# Patient Record
Sex: FEMALE | Race: ASIAN | NOT HISPANIC OR LATINO | Employment: FULL TIME | URBAN - METROPOLITAN AREA
[De-identification: names, ages, dates, MRNs, and addresses within clinical notes are randomized per-mention and may not be internally consistent; named-entity substitution may affect disease eponyms.]

---

## 2017-05-11 ENCOUNTER — OFFICE VISIT (OUTPATIENT)
Dept: MEDICAL GROUP | Facility: MEDICAL CENTER | Age: 22
End: 2017-05-11
Attending: INTERNAL MEDICINE
Payer: MEDICAID

## 2017-05-11 VITALS
OXYGEN SATURATION: 98 % | RESPIRATION RATE: 16 BRPM | HEIGHT: 65 IN | WEIGHT: 123.8 LBS | BODY MASS INDEX: 20.62 KG/M2 | HEART RATE: 100 BPM | SYSTOLIC BLOOD PRESSURE: 100 MMHG | TEMPERATURE: 98.5 F | DIASTOLIC BLOOD PRESSURE: 62 MMHG

## 2017-05-11 DIAGNOSIS — Z30.42 ON DEPO-PROVERA FOR CONTRACEPTION: ICD-10-CM

## 2017-05-11 PROCEDURE — 99213 OFFICE O/P EST LOW 20 MIN: CPT | Mod: 25 | Performed by: INTERNAL MEDICINE

## 2017-05-11 PROCEDURE — 99213 OFFICE O/P EST LOW 20 MIN: CPT | Performed by: INTERNAL MEDICINE

## 2017-05-11 PROCEDURE — 700111 HCHG RX REV CODE 636 W/ 250 OVERRIDE (IP): Performed by: INTERNAL MEDICINE

## 2017-05-11 RX ORDER — MEDROXYPROGESTERONE ACETATE 150 MG/ML
150 INJECTION, SUSPENSION INTRAMUSCULAR
Status: SHIPPED | OUTPATIENT
Start: 2017-07-27 | End: 2018-03-14

## 2017-05-11 RX ORDER — MEDROXYPROGESTERONE ACETATE 150 MG/ML
150 INJECTION, SUSPENSION INTRAMUSCULAR
Qty: 1 VIAL | Refills: 3
Start: 2017-07-27 | End: 2017-08-03

## 2017-05-11 RX ORDER — MEDROXYPROGESTERONE ACETATE 150 MG/ML
150 INJECTION, SUSPENSION INTRAMUSCULAR ONCE
Qty: 1 VIAL | Refills: 0 | Status: SHIPPED
Start: 2017-05-11 | End: 2017-05-11 | Stop reason: SDUPTHER

## 2017-05-11 RX ORDER — MEDROXYPROGESTERONE ACETATE 150 MG/ML
150 INJECTION, SUSPENSION INTRAMUSCULAR ONCE
Status: COMPLETED | OUTPATIENT
Start: 2017-05-11 | End: 2017-05-11

## 2017-05-11 RX ADMIN — MEDROXYPROGESTERONE ACETATE 150 MG: 150 INJECTION, SUSPENSION INTRAMUSCULAR at 14:09

## 2017-05-11 NOTE — PROGRESS NOTES
"Subjective:   Johanna Mata is a 22 y.o. female here today for deop injection    On Depo-Provera for contraception  Patient presents today for depo injection. He has been on depo for contraception in the past, most recent injection given around March 11 2016.  She is currently within the window to receive her next injection. She is happy with this method of birth control and tolerates it well.         Current medicines (including changes today)  Current Outpatient Prescriptions   Medication Sig Dispense Refill   • medroxyPROGESTERone (DEPO-PROVERA) 150 MG/ML Suspension 1 mL by Intramuscular route Once for 1 dose. 1 Vial 0   • meloxicam (MOBIC) 7.5 MG Tab Take 1 Tab by mouth every day. 30 Tab 1     Current Facility-Administered Medications   Medication Dose Route Frequency Provider Last Rate Last Dose   • medroxyPROGESTERone (DEPO-PROVERA) injection 150 mg  150 mg Intramuscular Once Gloria Martinez M.D.         She  has no significant past medical history    ROS   Denies abnormal vaginal bleeding, dysuria, pelvic pain  As above in HPI     Objective:     Blood pressure 100/62, pulse 100, temperature 36.9 °C (98.5 °F), resp. rate 16, height 1.651 m (5' 5\"), weight 56.155 kg (123 lb 12.8 oz), last menstrual period 11/11/2016, SpO2 98 %, not currently breastfeeding. Body mass index is 20.6 kg/(m^2).   Physical Exam:  Constitutional: Alert, no distress.  Skin: Warm, dry, good turgor, no rashes in visible areas.  Eye: Equal, round and reactive, conjunctiva clear, lids normal.  Psych: Alert and oriented x3, normal affect and mood.      Assessment and Plan:   The following treatment plan was discussed    1. On Depo-Provera for contraception  Patient currently in the window to receive her depo injection without a pregnancy test.  Given today.  She will return in 3 months for repeat injection on MA schedule.  I have ordered her depo injections for the next 3 injections.  - medroxyPROGESTERone (DEPO-PROVERA) injection " 150 mg; 1 mL by Intramuscular route Once.    Followup: Return in about 3 months (around 8/11/2017) for Depo injection.

## 2017-05-11 NOTE — MR AVS SNAPSHOT
"        Johanna Mata   2017 1:10 PM   Office Visit   MRN: [id number]    [facility]   Dept Phone:  [phone]    Description:  Female : 1995   Provider:  Gloria Martinez M.D.           Reason for Visit     Follow-Up DEPO      Allergies as of 2017     No Known Allergies      You were diagnosed with     On Depo-Provera for contraception   [[id number]]         Vital Signs     Blood Pressure Pulse Temperature Respirations Height Weight    100/62 mmHg 100 36.9 °C (98.5 °F) 16 1.651 m (5' 5\") 56.155 kg (123 lb 12.8 oz)    Body Mass Index Oxygen Saturation Last Menstrual Period Breastfeeding? Smoking Status       20.60 kg/m2 98% 2016 No Current Every Day Smoker       Basic Information     Date Of Birth Sex Race Ethnicity Preferred Language    1995 Female  Non- English      Your appointments     Aug 08, 2017  1:00 PM   Non Provider 1 with [facility]   [facility]    [address] [place] [id number]   [phone]           You will be receiving a confirmation call a few days before your appointment from our automated call confirmation system.              Problem List              ICD-10-CM Priority Class Noted - Resolved    Chronic bilateral low back pain without sciatica M54.5, G89.29   2016 - Present    Bilateral finger numbness R20.0   2016 - Present    Abnormal Pap smear of cervix R87.619   2016 - Present    On Depo-Provera for contraception Z30.40   2017 - Present      Health Maintenance        Date Due Completion Dates    IMM HEP B VACCINE (1 of 3 - Primary Series) 1993 ---    IMM HEP A VACCINE (1 of 2 - Standard Series) 1994 ---    IMM VARICELLA (CHICKENPOX) VACCINE (1 of 2 - 2 Dose Adolescent Series) 2006 ---    IMM DTaP/Tdap/Td Vaccine (1 - Tdap) 2012 ---    PAP SMEAR 2014 ---            Current Immunizations     HPV Quadrivalent Vaccine (GARDASIL) 2010, " 3/5/2010, 12/18/2009    Influenza TIV (IM) 10/3/2013    Influenza Vaccine Pediatric 12/18/2009    Influenza Vaccine Quad Inj (Preserved) 12/7/2016, 12/2/2014    Pneumococcal polysaccharide vaccine (PPSV-23) 5/11/2014  1:04 PM    Tuberculin Skin Test 2/18/2014  3:55 PM, 2/19/2013 12:15 PM, 2/11/2013  2:50 PM      Below and/or attached are the medications your provider expects you to take. Review all of your home medications and newly ordered medications with your provider and/or pharmacist. Follow medication instructions as directed by your provider and/or pharmacist. Please keep your medication list with you and share with your provider. Update the information when medications are discontinued, doses are changed, or new medications (including over-the-counter products) are added; and carry medication information at all times in the event of emergency situations     Allergies:  No Known Allergies          Medications  Valid as of: May 11, 2017 -  1:51 PM    Generic Name Brand Name Tablet Size Instructions for use    MedroxyPROGESTERone Acetate (Suspension) DEPO-PROVERA 150 MG/ML 1 mL by Intramuscular route EVERY 11 WEEKS for 7 days.        Meloxicam (Tab) MOBIC 7.5 MG Take 1 Tab by mouth every day.        .                 Medicines prescribed today were sent to:     [email or web address] [address] [facility]    [address] [facility] [address]    Phone: [phone] Fax: [phone]    Open 24 Hours?: No    [id number] - [place] - [address]    [address] [facility] [address]    Phone: [phone] Fax: [phone]    Open 24 Hours?: No      Medication refill instructions:       If your prescription bottle indicates you have medication refills left, it is not necessary to call your provider’s office. Please contact your pharmacy and they will refill your medication.    If your prescription bottle indicates you do not have any refills left, you may  request refills at any time through one of the following ways: The online BioCatch system (except Urgent Care), by calling your provider’s office, or by asking your pharmacy to contact your provider’s office with a refill request. Medication refills are processed only during regular business hours and may not be available until the next business day. Your provider may request additional information or to have a follow-up visit with you prior to refilling your medication.   *Please Note: Medication refills are assigned a new Rx number when refilled electronically. Your pharmacy may indicate that no refills were authorized even though a new prescription for the same medication is available at the pharmacy. Please request the medicine by name with the pharmacy before contacting your provider for a refill.           BioCatch Access Code: [email or web address]  Expires: 5/31/2017 11:23 AM    BioCatch  A secure, online tool to manage your health information     [employer]’s BioCatch® is a secure, online tool that connects you to your personalized health information from the privacy of your home -- day or night - making it very easy for you to manage your healthcare. Once the activation process is completed, you can even access your medical information using the BioCatch roberta, which is available for free in the Apple Roberta store or Google Play store.     BioCatch provides the following levels of access (as shown below):   My Chart Features   [facility] Doctor [facility]  Urgent  Care Non-[facility]  Primary Care  Doctor   Email your healthcare team securely and privately 24/7 X X X    Manage appointments: schedule your next appointment; view details of past/upcoming appointments X      Request prescription refills. X      View recent personal medical records, including lab and immunizations X X X X   View health record, including health history, allergies, medications X X X X   Read reports about your outpatient  visits, procedures, consult and ER notes X X X X   See your discharge summary, which is a recap of your hospital and/or ER visit that includes your diagnosis, lab results, and care plan. X X       How to register for Novelo:  1. Go to  [email or web address].  2. Click on the Sign Up Now box, which takes you to the New Member Sign Up page. You will need to provide the following information:  a. Enter your Novelo Access Code exactly as it appears at the top of this page. (You will not need to use this code after you’ve completed the sign-up process. If you do not sign up before the expiration date, you must request a new code.)   b. Enter your date of birth.   c. Enter your home email address.   d. Click Submit, and follow the next screen’s instructions.  3. Create a Novelo ID. This will be your Novelo login ID and cannot be changed, so think of one that is secure and easy to remember.  4. Create a Novelo password. You can change your password at any time.  5. Enter your Password Reset Question and Answer. This can be used at a later time if you forget your password.   6. Enter your e-mail address. This allows you to receive e-mail notifications when new information is available in Novelo.  7. Click Sign Up. You can now view your health information.    For assistance activating your Novelo account, call [phone]        Quit Tobacco Information     Do you want to quit using tobacco?    Quitting tobacco decreases risks of cancer, heart and lung disease, increases life expectancy, improves sense of taste and smell, and increases spending money, among other benefits.    If you are thinking about quitting, we can help.  • [employer] Quit Tobacco Program: 577.130.4831  o Program occurs weekly for four weeks and includes pharmacist consultation on products to support quitting smoking or chewing tobacco. A provider referral is needed for pharmacist consultation.  • Tobacco Users Help Hotline: 2-800-QUIT-NOW  (948-3892) or https://[place].quitlogix.org/  o Free, confidential telephone and online coaching for [place] residents. Sessions are designed on a schedule that is convenient for you. Eligible clients receive free nicotine replacement therapy.  • Nationally: www.smokefree.gov  o Information and professional assistance to support both immediate and long-term needs as you become, and remain, a non-smoker. Smokefree.gov allows you to choose the help that best fits your needs.

## 2017-05-11 NOTE — ASSESSMENT & PLAN NOTE
Patient presents today for depo injection. He has been on depo for contraception in the past, most recent injection given around March 11 2016.  She is currently within the window to receive her next injection. She is happy with this method of birth control and tolerates it well.

## 2018-12-10 ENCOUNTER — TELEPHONE (OUTPATIENT)
Dept: MEDICAL GROUP | Facility: PHYSICIAN GROUP | Age: 23
End: 2018-12-10

## 2018-12-10 NOTE — TELEPHONE ENCOUNTER
Pt father sees you and states that her father asked you if you would take on his daughter as a pt.       Would you like me to get her in for an appointment with you?    Please advise

## 2018-12-13 ENCOUNTER — OFFICE VISIT (OUTPATIENT)
Dept: MEDICAL GROUP | Facility: PHYSICIAN GROUP | Age: 23
End: 2018-12-13
Payer: COMMERCIAL

## 2018-12-13 VITALS
OXYGEN SATURATION: 94 % | SYSTOLIC BLOOD PRESSURE: 110 MMHG | HEART RATE: 82 BPM | TEMPERATURE: 98.4 F | HEIGHT: 65 IN | DIASTOLIC BLOOD PRESSURE: 60 MMHG | WEIGHT: 119.49 LBS | BODY MASS INDEX: 19.91 KG/M2

## 2018-12-13 DIAGNOSIS — Z23 NEED FOR VACCINATION: ICD-10-CM

## 2018-12-13 DIAGNOSIS — N91.2 AMENORRHEA: ICD-10-CM

## 2018-12-13 DIAGNOSIS — R87.619 ABNORMAL CERVICAL PAPANICOLAOU SMEAR, UNSPECIFIED ABNORMAL PAP FINDING: ICD-10-CM

## 2018-12-13 DIAGNOSIS — Z13.220 SCREENING, LIPID: ICD-10-CM

## 2018-12-13 DIAGNOSIS — F32.1 MODERATE MAJOR DEPRESSION (HCC): ICD-10-CM

## 2018-12-13 DIAGNOSIS — R05.3 CHRONIC COUGH: ICD-10-CM

## 2018-12-13 DIAGNOSIS — K59.00 CONSTIPATION, UNSPECIFIED CONSTIPATION TYPE: ICD-10-CM

## 2018-12-13 DIAGNOSIS — Z13.1 SCREENING FOR DIABETES MELLITUS (DM): ICD-10-CM

## 2018-12-13 LAB
APPEARANCE UR: ABNORMAL
BILIRUB UR STRIP-MCNC: ABNORMAL MG/DL
COLOR UR AUTO: ABNORMAL
GLUCOSE UR STRIP.AUTO-MCNC: ABNORMAL MG/DL
INT CON NEG: NEGATIVE
INT CON POS: POSITIVE
KETONES UR STRIP.AUTO-MCNC: ABNORMAL MG/DL
LEUKOCYTE ESTERASE UR QL STRIP.AUTO: ABNORMAL
NITRITE UR QL STRIP.AUTO: ABNORMAL
PH UR STRIP.AUTO: 8.5 [PH] (ref 5–8)
POC URINE PREGNANCY TEST: NORMAL
PROT UR QL STRIP: ABNORMAL MG/DL
RBC UR QL AUTO: ABNORMAL
SP GR UR STRIP.AUTO: 1.01
UROBILINOGEN UR STRIP-MCNC: 0.2 MG/DL

## 2018-12-13 PROCEDURE — 81025 URINE PREGNANCY TEST: CPT | Performed by: FAMILY MEDICINE

## 2018-12-13 PROCEDURE — 99215 OFFICE O/P EST HI 40 MIN: CPT | Mod: 25 | Performed by: FAMILY MEDICINE

## 2018-12-13 PROCEDURE — 90471 IMMUNIZATION ADMIN: CPT | Performed by: FAMILY MEDICINE

## 2018-12-13 PROCEDURE — 81002 URINALYSIS NONAUTO W/O SCOPE: CPT | Performed by: FAMILY MEDICINE

## 2018-12-13 PROCEDURE — 90686 IIV4 VACC NO PRSV 0.5 ML IM: CPT | Performed by: FAMILY MEDICINE

## 2018-12-13 RX ORDER — AZITHROMYCIN 250 MG/1
TABLET, FILM COATED ORAL
Qty: 6 TAB | Refills: 0 | Status: SHIPPED | OUTPATIENT
Start: 2018-12-13 | End: 2019-01-14

## 2018-12-13 RX ORDER — POLYETHYLENE GLYCOL 3350 17 G/17G
17 POWDER, FOR SOLUTION ORAL DAILY
Qty: 30 EACH | Refills: 5 | Status: SHIPPED | OUTPATIENT
Start: 2018-12-13 | End: 2019-06-12

## 2018-12-13 ASSESSMENT — PATIENT HEALTH QUESTIONNAIRE - PHQ9
SUM OF ALL RESPONSES TO PHQ QUESTIONS 1-9: 17
5. POOR APPETITE OR OVEREATING: 3 - NEARLY EVERY DAY
CLINICAL INTERPRETATION OF PHQ2 SCORE: 4

## 2018-12-13 NOTE — LETTER
[facility]  Gloria Martinez M.D.  [address]  [place] [address]  Fax: [phone]   Authorization for Release/Disclosure of   Protected Health Information   Name: JOHANNA MATA : 1995 SSN: [id number]   Address: [facility] [address] [place] [address] Phone:    [phone] (home)    I authorize the entity listed below to release/disclose the PHI below to:   [facility]/Gloria Martinez M.D. and Kayla Aguirre M.D.   Provider or Entity Name:    [facility]   Address   City, State, Zip   Phone:      Fax:     Reason for request: continuity of care   Information to be released:    [  ] LAST COLONOSCOPY,  including any PATH REPORT and follow-up  [  ] LAST FIT/COLOGUARD RESULT [  ] LAST DEXA  [  ] LAST MAMMOGRAM  [  ] LAST PAP  [  ] LAST LABS [  ] RETINA EXAM REPORT  [  ] IMMUNIZATION RECORDS  [  x] Release all info      [  ] Check here and initial the line next to each item to release ALL health information INCLUDING  _____ Care and treatment for drug and / or alcohol abuse  _____ HIV testing, infection status, or AIDS  _____ Genetic Testing    DATES OF SERVICE OR TIME PERIOD TO BE DISCLOSED: _____________  I understand and acknowledge that:  * This Authorization may be revoked at any time by you in writing, except if your health information has already been used or disclosed.  * Your health information that will be used or disclosed as a result of you signing this authorization could be re-disclosed by the recipient. If this occurs, your re-disclosed health information may no longer be protected by State or Federal laws.  * You may refuse to sign this Authorization. Your refusal will not affect your ability to obtain treatment.  * This Authorization becomes effective upon signing and will  on (date) __________.      If no date is indicated, this Authorization will  one (1) year from the signature date.    Name: Johanna Mata    Signature:   Date:     2018       PLEASE FAX  REQUESTED RECORDS BACK TO: [phone]

## 2018-12-13 NOTE — PROGRESS NOTES
Subjective:      Johanna Mata is a 23 y.o. female who presents with Abdominal Pain; Cough; and Patient Question (Depo shot)        HPI:    Patient is here to reestablish with me.  Her last visit with me was in .  She lost her insurance so she went to see Dr. Martinez at the [facility] in between.     Her last pap smear was in 2017 with [facility] martines came back positive for HPV. She did not have a biopsy. She was asked to follow up in one year. She does not have an appointment yet. She would like a pap smear here.  Prior to that the Pap [facility] in 2016 done at [facility] also came back positive for HPV.    The patient concerned about her Depo-Provera shots. Her last Depo-Provera shot was in April of this year with ELSI martines. She has not had a menstrual period since her last Depo-Provera shot. She stopped having Depo-Provera shots because she lost her Medicaid insurance.She has been on Depo-Provera since May 2014. She is not sexually active. Her last sexual activity was 1-2 years ago. She is  2 para 2. Her children are 4 and 5.     She complains of a productive cough that began two years ago. The phlegm is green. She has never been evaluated or treated for this cough. Patient reports she is short of breath while coughing. She denies hemoptysis, weight loss, fever or chills. She smokes four cigarettes a day. She has been smoking for ten years. She stopped smoking while pregnant.     She is constipated often. She is only passing one bowel movement a week. She has been on various stool softeners and laxatives including Senokot, Dulcolax and docusate without improvement. She has not tried Miralax or other fiber supplements. She reports some  right sided abdominal pain is noted when she is going to have a bowel movement.  This clears when she has already defecated.    She reports depression. She has been depressed for several months. Patient is a single mother and  "feels overwhelmed. She lives with her parents. The father of her children is not involved with the lives of their children. He does not currently provide child support, although, this is currently being disputed in court. She reports associated difficulty sleeping and poor appetite.     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 She denies regular exposure to hazardous chemicals.    She did not yet have her influenza vaccination.    Past medical history, past surgical history, family history reviewed-no changes    Social history reviewed-no changes    Allergies reviewed-no changes    Medications reviewed-no changes      ROS:  As per the HPI as shown above, the rest are negative.       Objective:     /60 (BP Location: Left arm, Patient Position: Sitting, BP Cuff Size: Adult)   Pulse 82   Temp 36.9 °C (98.4 °F) (Temporal)   Ht 1.651 m (5' 5\")   Wt 54.2 kg (119 lb 7.8 oz)   SpO2 94%   BMI 19.88 kg/m²     Physical Exam    Examined alert, awake, oriented, not in distress    Neck-supple, no lymphadenopathy, no thyromegaly  Lungs-clear to auscultation, no rales, no wheezes  Heart-regular rate and rhythm, no murmur  Abdomen-good bowel sounds, soft, nontender, no hepatosplenomegaly, no masses, no CVA tenderness  Extremities-no edema, clubbing, cyanosis  Psychiatric-affect is sad, tearful, judgment normal, behavior normal, thought content normal     Labs:  Office Visit on 12/13/2018   Component Date Value Ref Range Status   • POC Color 12/13/2018 yl  Negative Final   • POC Appearance 12/13/2018 clr  Negative Final   • POC Leukocyte Esterase 12/13/2018 neg  Negative Final   • POC Nitrites 12/13/2018 neg  Negative Final   • POC Urobiligen 12/13/2018 0.2  Negative (0.2) mg/dL Final   • POC Protein 12/13/2018 neg  Negative mg/dL Final   • POC Urine PH 12/13/2018 8.5* 5.0 - 8.0 Final   • POC Blood 12/13/2018 neg  Negative Final   • POC Specific Gravity " 12/13/2018 1.015  <1.005 - >1.030 Final   • POC Ketones 12/13/2018 neg  Negative mg/dL Final   • POC Bilirubin 12/13/2018 neg  Negative mg/dL Final   • POC Glucose 12/13/2018 neg  Negative mg/dL Final   • POC Urine Pregnancy Test 12/13/2018 NEG  Negative Final   • Internal Control Positive 12/13/2018 Positive   Final   • Internal Control Negative 12/13/2018 Negative   Final     Depression Screening    Little interest or pleasure in doing things?  2 - more than half the days   Feeling down, depressed , or hopeless? 2 - more than half the days   Trouble falling or staying asleep, or sleeping too much?  3 - nearly every day   Feeling tired or having little energy?  2 - more than half the days   Poor appetite or overeating?  3 - nearly every day   Feeling bad about yourself - or that you are a failure or have let yourself or your family down? 3 - nearly every day   Trouble concentrating on things, such as reading the newspaper or watching television? 0 - not at all   Moving or speaking so slowly that other people could have noticed.  Or the opposite - being so fidgety or restless that you have been moving around a lot more than usual?  2 - more than half the days   Thoughts that you would be better off dead, or of hurting yourself?  0 - not at all   Patient Health Questionnaire Score: 17       If depressive symptoms identified deferred to follow up visit unless specifically addressed in assesment and plan.    Interpretation of PHQ-9 Total Score   Score Severity   1-4 No Depression   5-9 Mild Depression   10-14 Moderate Depression   15-19 Moderately Severe Depression   20-27 Severe Depression         Assessment/Plan:   1. Need for vaccination  Patient received her influenza vaccination.  - Influenza Vaccine Quad Injection >3Y (PF)    2. Amenorrhea  I explained we must order a pregnancy test before she is started on Depo-Provera again. Her pregnancy test was negative.  The Depo-Provera shot is covered before we restart  it.  - Lipid Profile; Future  - COMP METABOLIC PANEL; Future  - CBC WITH DIFFERENTIAL; Future  - TSH; Future  - POCT Urinalysis  - POCT PREGNANCY    3. Chronic cough  I will prescribe Zithromax to treat for possible infection. This may be a smoker's cough. I recommended smoking cessation. I will order a chest x-ray since she has been having this cough for months with colored phlegm and history of tobacco dependence.  Strongly advised to quit cigarette use.  - Lipid Profile; Future  - COMP METABOLIC PANEL; Future  - CBC WITH DIFFERENTIAL; Future  - TSH; Future  - azithromycin (ZITHROMAX) 250 MG Tab; Take 2 tabs today then 1 tab daily for 4 days.  Dispense: 6 Tab; Refill: 0  - DX-CHEST-2 VIEWS; Future    4. Constipation, unspecified constipation type  We will put her on Miralax with a full glass of water on a daily basis. I advised her to continue taking the Miralax if she passes a normal bowel movement. She should decrease how much she takes if her bowel movements significantly increase in frequency. She understands there is no harm in using Miralax for maintenance.  We will check TSH to rule out metabolic problem.  - Lipid Profile; Future  - COMP METABOLIC PANEL; Future  - CBC WITH DIFFERENTIAL; Future  - TSH; Future  - POCT Urinalysis  - polyethylene glycol/lytes (MIRALAX) Pack; Take 1 Packet by mouth every day.  Dispense: 30 Each; Refill: 5    5. Abnormal cervical Papanicolaou smear, unspecified abnormal pap finding  She was positive for HPV on her pap smear from December 2016 and August of 2017. We will do a pap smear next visit.  [facility]. She signed a release for records request.  - Lipid Profile; Future  - COMP METABOLIC PANEL; Future  - CBC WITH DIFFERENTIAL; Future  - TSH; Future    6. Moderate major depression (HCC)  We will start the patient on 50 mg Zoloft half a tablet daily the first week and go up to a full tablet daily if no side effects.. I explained we will  start the dose low and adjust as needed after one month.  Made aware he can take 2 weeks a month for it to work.  I will refer the patient to a counselor.  - sertraline (ZOLOFT) 50 MG Tab; Take 1 Tab by mouth every day.  Dispense: 30 Tab; Refill: 1    7. Screening, lipid  We will do screening lipid panel.  - Lipid Profile; Future  - COMP METABOLIC PANEL; Future  - CBC WITH DIFFERENTIAL; Future  - TSH; Future    8. Screening for diabetes mellitus (DM)  We will do screening for diabetes.  - Lipid Profile; Future  - COMP METABOLIC PANEL; Future  - CBC WITH DIFFERENTIAL; Future  - TSH; Future    Patient was seen for 40 minutes face to face of which > 50% of appointment time was spent on counseling and coordination of care regarding the above.     Woody FU (Scribe), am scribing for, and in the presence of, Kayla Aguirre MD     Electronically signed by: Woody Leigh (Scribe), 12/13/2018    IKayla MD personally performed the services described in this documentation, as scribed by Woody Leigh in my presence, and it is both accurate and complete.

## 2018-12-14 ENCOUNTER — HOSPITAL ENCOUNTER (OUTPATIENT)
Dept: RADIOLOGY | Facility: MEDICAL CENTER | Age: 23
End: 2018-12-14
Attending: FAMILY MEDICINE
Payer: COMMERCIAL

## 2018-12-14 ENCOUNTER — HOSPITAL ENCOUNTER (OUTPATIENT)
Dept: LAB | Facility: MEDICAL CENTER | Age: 23
End: 2018-12-14
Attending: FAMILY MEDICINE
Payer: COMMERCIAL

## 2018-12-14 DIAGNOSIS — N91.2 AMENORRHEA: ICD-10-CM

## 2018-12-14 DIAGNOSIS — Z13.220 SCREENING, LIPID: ICD-10-CM

## 2018-12-14 DIAGNOSIS — K59.00 CONSTIPATION, UNSPECIFIED CONSTIPATION TYPE: ICD-10-CM

## 2018-12-14 DIAGNOSIS — R05.3 CHRONIC COUGH: ICD-10-CM

## 2018-12-14 DIAGNOSIS — R87.619 ABNORMAL CERVICAL PAPANICOLAOU SMEAR, UNSPECIFIED ABNORMAL PAP FINDING: ICD-10-CM

## 2018-12-14 DIAGNOSIS — Z13.1 SCREENING FOR DIABETES MELLITUS (DM): ICD-10-CM

## 2018-12-14 LAB
BASOPHILS # BLD AUTO: 1.1 % (ref 0–1.8)
BASOPHILS # BLD: 0.07 K/UL (ref 0–0.12)
EOSINOPHIL # BLD AUTO: 0.16 K/UL (ref 0–0.51)
EOSINOPHIL NFR BLD: 2.6 % (ref 0–6.9)
ERYTHROCYTE [DISTWIDTH] IN BLOOD BY AUTOMATED COUNT: 41.5 FL (ref 35.9–50)
HCT VFR BLD AUTO: 47.3 % (ref 37–47)
HGB BLD-MCNC: 15 G/DL (ref 12–16)
IMM GRANULOCYTES # BLD AUTO: 0.01 K/UL (ref 0–0.11)
IMM GRANULOCYTES NFR BLD AUTO: 0.2 % (ref 0–0.9)
LYMPHOCYTES # BLD AUTO: 2.08 K/UL (ref 1–4.8)
LYMPHOCYTES NFR BLD: 34 % (ref 22–41)
MCH RBC QN AUTO: 28.4 PG (ref 27–33)
MCHC RBC AUTO-ENTMCNC: 31.7 G/DL (ref 33.6–35)
MCV RBC AUTO: 89.6 FL (ref 81.4–97.8)
MONOCYTES # BLD AUTO: 0.31 K/UL (ref 0–0.85)
MONOCYTES NFR BLD AUTO: 5.1 % (ref 0–13.4)
NEUTROPHILS # BLD AUTO: 3.49 K/UL (ref 2–7.15)
NEUTROPHILS NFR BLD: 57 % (ref 44–72)
NRBC # BLD AUTO: 0 K/UL
NRBC BLD-RTO: 0 /100 WBC
PLATELET # BLD AUTO: 284 K/UL (ref 164–446)
PMV BLD AUTO: 9.3 FL (ref 9–12.9)
RBC # BLD AUTO: 5.28 M/UL (ref 4.2–5.4)
WBC # BLD AUTO: 6.1 K/UL (ref 4.8–10.8)

## 2018-12-14 PROCEDURE — 85025 COMPLETE CBC W/AUTO DIFF WBC: CPT

## 2018-12-14 PROCEDURE — 71046 X-RAY EXAM CHEST 2 VIEWS: CPT

## 2018-12-14 PROCEDURE — 80061 LIPID PANEL: CPT

## 2018-12-14 PROCEDURE — 36415 COLL VENOUS BLD VENIPUNCTURE: CPT

## 2018-12-14 PROCEDURE — 80053 COMPREHEN METABOLIC PANEL: CPT

## 2018-12-14 PROCEDURE — 84443 ASSAY THYROID STIM HORMONE: CPT

## 2018-12-15 LAB
ALBUMIN SERPL BCP-MCNC: 4.3 G/DL (ref 3.2–4.9)
ALBUMIN/GLOB SERPL: 1.6 G/DL
ALP SERPL-CCNC: 69 U/L (ref 30–99)
ALT SERPL-CCNC: 16 U/L (ref 2–50)
ANION GAP SERPL CALC-SCNC: 8 MMOL/L (ref 0–11.9)
AST SERPL-CCNC: 16 U/L (ref 12–45)
BILIRUB SERPL-MCNC: 0.6 MG/DL (ref 0.1–1.5)
BUN SERPL-MCNC: 14 MG/DL (ref 8–22)
CALCIUM SERPL-MCNC: 10.2 MG/DL (ref 8.5–10.5)
CHLORIDE SERPL-SCNC: 106 MMOL/L (ref 96–112)
CHOLEST SERPL-MCNC: 205 MG/DL (ref 100–199)
CO2 SERPL-SCNC: 28 MMOL/L (ref 20–33)
CREAT SERPL-MCNC: 0.69 MG/DL (ref 0.5–1.4)
GLOBULIN SER CALC-MCNC: 2.7 G/DL (ref 1.9–3.5)
GLUCOSE SERPL-MCNC: 93 MG/DL (ref 65–99)
HDLC SERPL-MCNC: 78 MG/DL
LDLC SERPL CALC-MCNC: 108 MG/DL
POTASSIUM SERPL-SCNC: 4.4 MMOL/L (ref 3.6–5.5)
PROT SERPL-MCNC: 7 G/DL (ref 6–8.2)
SODIUM SERPL-SCNC: 142 MMOL/L (ref 135–145)
TRIGL SERPL-MCNC: 96 MG/DL (ref 0–149)
TSH SERPL DL<=0.005 MIU/L-ACNC: 0.89 UIU/ML (ref 0.38–5.33)

## 2018-12-17 ENCOUNTER — PATIENT MESSAGE (OUTPATIENT)
Dept: MEDICAL GROUP | Facility: PHYSICIAN GROUP | Age: 23
End: 2018-12-17

## 2018-12-17 DIAGNOSIS — F41.9 ANXIETY: ICD-10-CM

## 2018-12-17 NOTE — PATIENT COMMUNICATION
Patient saw Dr. Aguirre last weeks.  She would like to know if she needs to stop the medication that was rx by Dr. Aguirre, she feels that's her mind and heart are racinng having headache and irratable, and trouble sleeping

## 2018-12-21 DIAGNOSIS — F41.9 ANXIETY: ICD-10-CM

## 2018-12-21 RX ORDER — HYDROXYZINE HYDROCHLORIDE 25 MG/1
25 TABLET, FILM COATED ORAL 3 TIMES DAILY PRN
Qty: 30 TAB | Refills: 1 | Status: SHIPPED | OUTPATIENT
Start: 2018-12-21 | End: 2019-01-02 | Stop reason: SDUPTHER

## 2019-01-02 ENCOUNTER — PATIENT MESSAGE (OUTPATIENT)
Dept: MEDICAL GROUP | Facility: PHYSICIAN GROUP | Age: 24
End: 2019-01-02

## 2019-01-02 RX ORDER — GABAPENTIN 100 MG/1
100-200 CAPSULE ORAL 3 TIMES DAILY PRN
Qty: 60 CAP | Refills: 0 | Status: SHIPPED | OUTPATIENT
Start: 2019-01-02 | End: 2019-01-14

## 2019-01-02 RX ORDER — HYDROXYZINE HYDROCHLORIDE 25 MG/1
25 TABLET, FILM COATED ORAL 3 TIMES DAILY PRN
Qty: 30 TAB | Refills: 1 | Status: SHIPPED | OUTPATIENT
Start: 2019-01-02 | End: 2019-01-14

## 2019-01-02 NOTE — TELEPHONE ENCOUNTER
Spoke with patient to try to get her in earlier than the 14th.  She will contact us back if she can get off work to get in

## 2019-01-14 ENCOUNTER — HOSPITAL ENCOUNTER (OUTPATIENT)
Facility: MEDICAL CENTER | Age: 24
End: 2019-01-14
Attending: FAMILY MEDICINE
Payer: COMMERCIAL

## 2019-01-14 ENCOUNTER — HOSPITAL ENCOUNTER (OUTPATIENT)
Dept: LAB | Facility: MEDICAL CENTER | Age: 24
End: 2019-01-14
Attending: FAMILY MEDICINE
Payer: COMMERCIAL

## 2019-01-14 ENCOUNTER — OFFICE VISIT (OUTPATIENT)
Dept: MEDICAL GROUP | Facility: PHYSICIAN GROUP | Age: 24
End: 2019-01-14
Payer: COMMERCIAL

## 2019-01-14 VITALS
TEMPERATURE: 98.4 F | HEIGHT: 65 IN | BODY MASS INDEX: 19.32 KG/M2 | WEIGHT: 115.96 LBS | HEART RATE: 84 BPM | DIASTOLIC BLOOD PRESSURE: 60 MMHG | SYSTOLIC BLOOD PRESSURE: 110 MMHG | OXYGEN SATURATION: 98 %

## 2019-01-14 DIAGNOSIS — F32.2 SEVERE DEPRESSION (HCC): ICD-10-CM

## 2019-01-14 DIAGNOSIS — R87.810 CERVICAL HIGH RISK HPV (HUMAN PAPILLOMAVIRUS) TEST POSITIVE: ICD-10-CM

## 2019-01-14 DIAGNOSIS — F41.9 ANXIETY: ICD-10-CM

## 2019-01-14 DIAGNOSIS — N91.2 AMENORRHEA: ICD-10-CM

## 2019-01-14 DIAGNOSIS — R10.32 LLQ PAIN: ICD-10-CM

## 2019-01-14 DIAGNOSIS — Z01.419 ENCOUNTER FOR ROUTINE GYNECOLOGICAL EXAMINATION WITH PAPANICOLAOU SMEAR OF CERVIX: ICD-10-CM

## 2019-01-14 PROBLEM — Z30.42 ON DEPO-PROVERA FOR CONTRACEPTION: Status: RESOLVED | Noted: 2017-05-11 | Resolved: 2019-01-14

## 2019-01-14 LAB — PROLACTIN SERPL-MCNC: 4.2 NG/ML (ref 2.8–26)

## 2019-01-14 PROCEDURE — 99000 SPECIMEN HANDLING OFFICE-LAB: CPT | Performed by: FAMILY MEDICINE

## 2019-01-14 PROCEDURE — 88175 CYTOPATH C/V AUTO FLUID REDO: CPT

## 2019-01-14 PROCEDURE — 87624 HPV HI-RISK TYP POOLED RSLT: CPT

## 2019-01-14 PROCEDURE — 84146 ASSAY OF PROLACTIN: CPT

## 2019-01-14 PROCEDURE — 36415 COLL VENOUS BLD VENIPUNCTURE: CPT

## 2019-01-14 PROCEDURE — 99395 PREV VISIT EST AGE 18-39: CPT | Performed by: FAMILY MEDICINE

## 2019-01-14 PROCEDURE — 99214 OFFICE O/P EST MOD 30 MIN: CPT | Mod: 25 | Performed by: FAMILY MEDICINE

## 2019-01-14 RX ORDER — HYDROXYZINE 50 MG/1
50 TABLET, FILM COATED ORAL
Qty: 30 TAB | Refills: 1 | Status: SHIPPED | OUTPATIENT
Start: 2019-01-14 | End: 2020-09-01

## 2019-01-14 RX ORDER — SERTRALINE HYDROCHLORIDE 100 MG/1
100 TABLET, FILM COATED ORAL DAILY
Qty: 30 TAB | Refills: 2 | Status: SHIPPED | OUTPATIENT
Start: 2019-01-14 | End: 2020-09-01

## 2019-01-14 ASSESSMENT — PATIENT HEALTH QUESTIONNAIRE - PHQ9
CLINICAL INTERPRETATION OF PHQ2 SCORE: 4
SUM OF ALL RESPONSES TO PHQ QUESTIONS 1-9: 23
5. POOR APPETITE OR OVEREATING: 3 - NEARLY EVERY DAY

## 2019-01-14 NOTE — LETTER
[facility]  Kayla Aguirre M.D.  [address]  [place] [address]  Fax: [phone]   Authorization for Release/Disclosure of   Protected Health Information   Name: JOHANNA MATA : 1995 SSN: [id number]   Address: [facility] [address] [place] [address] Phone:    [phone] (home)    I authorize the entity listed below to release/disclose the PHI below to:   [facility]/Kayla Aguirre M.D. and Kayla Aguirre M.D.   Provider or Entity Name:     Address   City, State, Zip   Phone:      Fax:     Reason for request: continuity of care   Information to be released:    [  ] LAST COLONOSCOPY,  including any PATH REPORT and follow-up  [  ] LAST FIT/COLOGUARD RESULT [  ] LAST DEXA  [  ] LAST MAMMOGRAM  [  ] LAST PAP  [  ] LAST LABS [  ] RETINA EXAM REPORT  [  ] IMMUNIZATION RECORDS  [  ] Release all info      [  ] Check here and initial the line next to each item to release ALL health information INCLUDING  _____ Care and treatment for drug and / or alcohol abuse  _____ HIV testing, infection status, or AIDS  _____ Genetic Testing    DATES OF SERVICE OR TIME PERIOD TO BE DISCLOSED: _____________  I understand and acknowledge that:  * This Authorization may be revoked at any time by you in writing, except if your health information has already been used or disclosed.  * Your health information that will be used or disclosed as a result of you signing this authorization could be re-disclosed by the recipient. If this occurs, your re-disclosed health information may no longer be protected by State or Federal laws.  * You may refuse to sign this Authorization. Your refusal will not affect your ability to obtain treatment.  * This Authorization becomes effective upon signing and will  on (date) __________.      If no date is indicated, this Authorization will  one (1) year from the signature date.    Name: Johanna Mata    Signature:   Date:     2019       PLEASE FAX REQUESTED RECORDS BACK  TO: [phone]

## 2019-01-15 ENCOUNTER — HOSPITAL ENCOUNTER (OUTPATIENT)
Dept: RADIOLOGY | Facility: MEDICAL CENTER | Age: 24
End: 2019-01-15
Attending: FAMILY MEDICINE
Payer: COMMERCIAL

## 2019-01-15 DIAGNOSIS — Z01.419 ENCOUNTER FOR ROUTINE GYNECOLOGICAL EXAMINATION WITH PAPANICOLAOU SMEAR OF CERVIX: ICD-10-CM

## 2019-01-15 DIAGNOSIS — R10.32 LLQ PAIN: ICD-10-CM

## 2019-01-15 DIAGNOSIS — R93.89 ABNORMAL ULTRASOUND OF PELVIS: ICD-10-CM

## 2019-01-15 DIAGNOSIS — R87.810 CERVICAL HIGH RISK HPV (HUMAN PAPILLOMAVIRUS) TEST POSITIVE: ICD-10-CM

## 2019-01-15 DIAGNOSIS — R10.32 LEFT LOWER QUADRANT PAIN: ICD-10-CM

## 2019-01-15 PROCEDURE — 76830 TRANSVAGINAL US NON-OB: CPT

## 2019-01-15 NOTE — PROGRESS NOTES
Subjective:      Johanna Mata is a 24 y.o. female who presents with Gynecologic Exam (pap and gyn)            HPI     Patient is here for routine GYN exam/Pap smear.  She said her last [facility] smear was in  [facility].  She said she is had positive HPV in 2016 and on her last Pap smear but no malignancy or dysplasia on Pap smear.  Patient is  2 para 2.  She has been amenorrheic for the last 2 years.  She was on Depo-Provera shot for a total of 4 years with the last shot in 2018.  She has not started having her period off the Depo-Provera shot.  We have done urine pregnancy test last month which came back negative.  She has not been sexually active in a while.    We started her on sertraline for moderately severe depression month ago.  We started with 25 mg 1 tablet daily and increase the dose of 50 mg daily.  She does not think this is making a difference with her problem.  We also put her on hydroxyzine 25 mg 1 tablet twice a day to help manage the anxiety symptoms.  She said that the medication helps with insomnia if she takes 2 tablets at night but not the anxiety during the day.  Dr. García who was covering for me while I was on vacation 2 weeks ago put the patient on gabapentin to see if this would help with the anxiety symptoms but she said it did not make a difference at all.  She has not worked in the last 2 weeks due to her depression.  She feels worthless.  The father of her children  is is not giving any child support and has now a new family of his own with a new baby.  She has not been talking to her parents or her friends about her depression. we have referred her to the psychologist for counseling but according to our referral department there is no psychologist contracted with insurance.  Patient was asked to call her insurance herself with regard to other options such as virtual consultation.    She started having left lower quadrant pain on and off in the last 2 weeks.   She continues to take the MiraLAX for constipation.  She said she is still constipated and the MiraLAX has not helped.  She said she has a bowel movement about every 3-4 days.  She denies any problems with urination.    I reviewed the following    History reviewed. No pertinent past medical history.     Past Surgical History:   Procedure Laterality Date   • GYN SURGERY  5/10/2014    Performed by Mindi Germain M.D. at [facility]       No Known Allergies    Current Outpatient Prescriptions   Medication Sig Dispense Refill   • sertraline (ZOLOFT) 100 MG Tab Take 1 Tab by mouth every day. 30 Tab 2   • hydrOXYzine HCl (ATARAX) 50 MG Tab Take 1 Tab by mouth every bedtime. 30 Tab 1   • polyethylene glycol/lytes (MIRALAX) Pack Take 1 Packet by mouth every day. 30 Each 5     No current facility-administered medications for this visit.         Family History   Problem Relation Age of Onset   • Diabetes Maternal Grandmother    • Hypertension Maternal Grandmother    • Cancer Maternal Grandfather 60        colon cancer   • Diabetes Paternal Grandmother    • Heart Disease Paternal Grandfather    • Diabetes Paternal Grandfather 50        fatal MI   • Hyperlipidemia Mother    • Diabetes Mother    • Lung Disease Mother         asthma   • Hypertension Mother    • Hypertension Father    • Lung Disease Brother        Social History     Social History   • Marital status: Single     Spouse name: N/A   • Number of children: N/A   • Years of education: N/A     Occupational History   • [employer] Student   • [employer] - AA business      Social History Main Topics   • Smoking status: Current Every Day Smoker     Packs/day: 0.25     Years: 10.00     Types: Cigarettes   • Smokeless tobacco: Never Used   • Alcohol use 4.8 oz/week     8 Shots of liquor per week      Comment: eight shots one day per week   • Drug use: No   • Sexual activity: Yes     Partners: Male     Birth control/ protection: Injection     Other Topics Concern   • Not  "on file     Social History Narrative   • No narrative on file        ROS     As per HPI, the rest are negative.       Objective:     /60 (BP Location: Left arm, Patient Position: Sitting, BP Cuff Size: Adult)   Pulse 84   Temp 36.9 °C (98.4 °F) (Temporal)   Ht 1.651 m (5' 5\")   Wt 52.6 kg (115 lb 15.4 oz)   SpO2 98%   BMI 19.30 kg/m²      Physical Exam   Constitutional: She is oriented to person, place, and time. She appears well-developed and well-nourished. No distress.   HENT:   Head: Normocephalic and atraumatic.   Right Ear: External ear normal.   Left Ear: External ear normal.   Nose: Nose normal.   Mouth/Throat: Oropharynx is clear and moist. No oropharyngeal exudate.   Eyes: Pupils are equal, round, and reactive to light. Conjunctivae and EOM are normal. Right eye exhibits no discharge. Left eye exhibits no discharge. No scleral icterus.   Neck: Normal range of motion. Neck supple. No tracheal deviation present. No thyromegaly present.   Cardiovascular: Normal rate, regular rhythm and normal heart sounds.  Exam reveals no gallop.    No murmur heard.  Pulmonary/Chest: Effort normal and breath sounds normal. No stridor. No respiratory distress. She has no wheezes. She has no rales. Right breast exhibits no inverted nipple, no mass, no nipple discharge, no skin change and no tenderness. Left breast exhibits no inverted nipple, no mass, no nipple discharge, no skin change and no tenderness. Breasts are symmetrical.   Abdominal: Soft. Bowel sounds are normal. She exhibits no distension and no mass. There is no tenderness. There is no rebound and no guarding. Hernia confirmed negative in the right inguinal area and confirmed negative in the left inguinal area.   Genitourinary: Vagina normal and uterus normal. No breast tenderness, discharge or bleeding. Pelvic exam was performed with patient supine. No labial fusion. There is no rash, tenderness, lesion or injury on the right labia. There is no rash, " tenderness, lesion or injury on the left labia. Uterus is not deviated, not enlarged, not fixed and not tender. Cervix exhibits friability. Cervix exhibits no motion tenderness and no discharge. Right adnexum displays tenderness. Right adnexum displays no mass and no fullness. Left adnexum displays tenderness. Left adnexum displays no mass and no fullness. No tenderness or bleeding in the vagina. No foreign body in the vagina. No vaginal discharge found.   Musculoskeletal: Normal range of motion. She exhibits no edema or tenderness.   Lymphadenopathy:     She has no cervical adenopathy.        Right: No inguinal adenopathy present.        Left: No inguinal adenopathy present.   Neurological: She is alert and oriented to person, place, and time. She displays normal reflexes. No cranial nerve deficit. She exhibits normal muscle tone. Coordination normal.   Skin: Skin is warm. No rash noted. She is not diaphoretic. No erythema. No pallor.   Psychiatric: She has a normal mood and affect. Her behavior is normal. Thought content normal.      Depression Screening    Little interest or pleasure in doing things?  1 - several days   Feeling down, depressed , or hopeless? 3 - nearly every day   Trouble falling or staying asleep, or sleeping too much?  3 - nearly every day   Feeling tired or having little energy?  3 - nearly every day   Poor appetite or overeating?  3 - nearly every day   Feeling bad about yourself - or that you are a failure or have let yourself or your family down? 3 - nearly every day   Trouble concentrating on things, such as reading the newspaper or watching television? 2 - more than half the days   Moving or speaking so slowly that other people could have noticed.  Or the opposite - being so fidgety or restless that you have been moving around a lot more than usual?  3 - nearly every day   Thoughts that you would be better off dead, or of hurting yourself?  2 - more than half the days   Patient Health  Questionnaire Score: 23       If depressive symptoms identified deferred to follow up visit unless specifically addressed in assesment and plan.    Interpretation of PHQ-9 Total Score   Score Severity   1-4 No Depression   5-9 Mild Depression   10-14 Moderate Depression   15-19 Moderately Severe Depression   20-27 Severe Depression            Assessment/Plan:     1. Encounter for routine gynecological examination with Papanicolaou smear of cervix  Complete exam was done.  Pap smear was done.  Specimen was packaged and sent to the lab.  - THINPREP PAP WITH HPV; Future    2. Cervical high risk HPV (human papillomavirus) test positive  HPV test was done.  - THINPREP PAP WITH HPV; Future    3. Severe depression (HCC)  PHQ-9 score is higher than before.  This is now consistent with severe depression.  She denies any suicidal thoughts.  Unfortunately her insurance is not contracted with psychologist in [facility].  She was instructed to call her insurance with regards to other options available for her.  Counseling done today with regards to having good support group in the family and with friends.  Increase sertraline to 100 mg 1 tablet daily.  Reevaluate in a month.  Reevaluate in a month.  - sertraline (ZOLOFT) 100 MG Tab; Take 1 Tab by mouth every day.  Dispense: 30 Tab; Refill: 2    4. Anxiety  Increase hydroxyzine to 50 mg 1 tablet at night.  Discontinue gabapentin because this is not helping.  - hydrOXYzine HCl (ATARAX) 50 MG Tab; Take 1 Tab by mouth every bedtime.  Dispense: 30 Tab; Refill: 1    5. Amenorrhea  I will check prolactin to rule out pituitary problem.  - PROLACTIN; Future    6. LLQ pain  We will get transvaginal pelvic ultrasound for further evaluation.  I am thinking this may be related to the constipation but we will have to rule out GYN pathology.  - US-PELVIC COMPLETE (TRANSABDOMINAL/TRANSVAGINAL) (COMBO); Future      Please note that this dictation was created using voice recognition software. I have  worked with consultants from the vendor as well as technical experts from [facility] to optimize the interface. I have made every reasonable attempt to correct obvious errors, but I expect that there are errors of grammar and possibly content I did not discover before finalizing the note.

## 2019-01-16 LAB
CYTOLOGY REG CYTOL: NORMAL
HPV HR 12 DNA CVX QL NAA+PROBE: NEGATIVE
HPV16 DNA SPEC QL NAA+PROBE: NEGATIVE
HPV18 DNA SPEC QL NAA+PROBE: NEGATIVE
SPECIMEN SOURCE: NORMAL

## 2019-01-17 ENCOUNTER — HOSPITAL ENCOUNTER (OUTPATIENT)
Dept: LAB | Facility: MEDICAL CENTER | Age: 24
End: 2019-01-17
Attending: FAMILY MEDICINE
Payer: COMMERCIAL

## 2019-01-17 DIAGNOSIS — R10.32 LEFT LOWER QUADRANT PAIN: ICD-10-CM

## 2019-01-17 DIAGNOSIS — R93.89 ABNORMAL ULTRASOUND OF PELVIS: ICD-10-CM

## 2019-01-17 LAB
APPEARANCE UR: ABNORMAL
BACTERIA #/AREA URNS HPF: NEGATIVE /HPF
BILIRUB UR QL STRIP.AUTO: NEGATIVE
CAOX CRY #/AREA URNS HPF: ABNORMAL /HPF
COLOR UR: YELLOW
EPI CELLS #/AREA URNS HPF: ABNORMAL /HPF
GLUCOSE UR STRIP.AUTO-MCNC: NEGATIVE MG/DL
HYALINE CASTS #/AREA URNS LPF: ABNORMAL /LPF
KETONES UR STRIP.AUTO-MCNC: ABNORMAL MG/DL
LEUKOCYTE ESTERASE UR QL STRIP.AUTO: ABNORMAL
MICRO URNS: ABNORMAL
NITRITE UR QL STRIP.AUTO: NEGATIVE
PH UR STRIP.AUTO: 6 [PH]
PROT UR QL STRIP: NEGATIVE MG/DL
RBC # URNS HPF: ABNORMAL /HPF
RBC UR QL AUTO: NEGATIVE
SP GR UR STRIP.AUTO: 1.03
UROBILINOGEN UR STRIP.AUTO-MCNC: 0.2 MG/DL
WBC #/AREA URNS HPF: ABNORMAL /HPF

## 2019-01-17 PROCEDURE — 81001 URINALYSIS AUTO W/SCOPE: CPT

## 2019-01-17 PROCEDURE — 87086 URINE CULTURE/COLONY COUNT: CPT

## 2019-01-19 LAB
BACTERIA UR CULT: ABNORMAL
BACTERIA UR CULT: ABNORMAL
SIGNIFICANT IND 70042: ABNORMAL
SITE SITE: ABNORMAL
SOURCE SOURCE: ABNORMAL

## 2019-01-21 DIAGNOSIS — N30.00 ACUTE CYSTITIS WITHOUT HEMATURIA: ICD-10-CM

## 2019-01-21 RX ORDER — AMOXICILLIN 500 MG/1
500 CAPSULE ORAL 3 TIMES DAILY
Qty: 21 CAP | Refills: 0 | Status: SHIPPED | OUTPATIENT
Start: 2019-01-21 | End: 2019-06-12

## 2019-02-11 ENCOUNTER — APPOINTMENT (OUTPATIENT)
Dept: MEDICAL GROUP | Facility: PHYSICIAN GROUP | Age: 24
End: 2019-02-11
Payer: COMMERCIAL

## 2019-04-16 ENCOUNTER — TELEPHONE (OUTPATIENT)
Dept: SCHEDULING | Facility: IMAGING CENTER | Age: 24
End: 2019-04-16

## 2019-04-17 ENCOUNTER — HOSPITAL ENCOUNTER (OUTPATIENT)
Facility: MEDICAL CENTER | Age: 24
End: 2019-04-17
Attending: PHYSICIAN ASSISTANT
Payer: MEDICAID

## 2019-04-17 ENCOUNTER — OFFICE VISIT (OUTPATIENT)
Dept: URGENT CARE | Facility: CLINIC | Age: 24
End: 2019-04-17
Payer: MEDICAID

## 2019-04-17 VITALS
SYSTOLIC BLOOD PRESSURE: 112 MMHG | RESPIRATION RATE: 16 BRPM | WEIGHT: 115 LBS | DIASTOLIC BLOOD PRESSURE: 64 MMHG | TEMPERATURE: 99.3 F | OXYGEN SATURATION: 99 % | HEIGHT: 65 IN | HEART RATE: 73 BPM | BODY MASS INDEX: 19.16 KG/M2

## 2019-04-17 DIAGNOSIS — B37.9 YEAST INFECTION: ICD-10-CM

## 2019-04-17 DIAGNOSIS — N30.00 ACUTE CYSTITIS WITHOUT HEMATURIA: ICD-10-CM

## 2019-04-17 LAB
APPEARANCE UR: NORMAL
BILIRUB UR STRIP-MCNC: NEGATIVE MG/DL
COLOR UR AUTO: NORMAL
FORWARD REASON: SPWHY: NORMAL
FORWARDED TO LAB: SPWHR: NORMAL
GLUCOSE UR STRIP.AUTO-MCNC: NEGATIVE MG/DL
KETONES UR STRIP.AUTO-MCNC: NEGATIVE MG/DL
LEUKOCYTE ESTERASE UR QL STRIP.AUTO: NORMAL
NITRITE UR QL STRIP.AUTO: NEGATIVE
PH UR STRIP.AUTO: 7 [PH] (ref 5–8)
PROT UR QL STRIP: NORMAL MG/DL
RBC UR QL AUTO: NEGATIVE
SP GR UR STRIP.AUTO: 1.02
SPECIMEN SENT: SPWT1: NORMAL
UROBILINOGEN UR STRIP-MCNC: 0.2 MG/DL

## 2019-04-17 PROCEDURE — 99214 OFFICE O/P EST MOD 30 MIN: CPT | Performed by: PHYSICIAN ASSISTANT

## 2019-04-17 PROCEDURE — 99000 SPECIMEN HANDLING OFFICE-LAB: CPT | Performed by: PHYSICIAN ASSISTANT

## 2019-04-17 PROCEDURE — 81002 URINALYSIS NONAUTO W/O SCOPE: CPT | Performed by: PHYSICIAN ASSISTANT

## 2019-04-17 RX ORDER — FLUCONAZOLE 150 MG/1
TABLET ORAL
Qty: 2 TAB | Refills: 0 | Status: SHIPPED | OUTPATIENT
Start: 2019-04-17 | End: 2019-06-12

## 2019-04-17 RX ORDER — NITROFURANTOIN 25; 75 MG/1; MG/1
100 CAPSULE ORAL EVERY 12 HOURS
Qty: 10 CAP | Refills: 0 | Status: SHIPPED | OUTPATIENT
Start: 2019-04-17 | End: 2019-04-22

## 2019-04-17 ASSESSMENT — ENCOUNTER SYMPTOMS
COUGH: 0
PALPITATIONS: 0
FEVER: 0
SHORTNESS OF BREATH: 0
FLANK PAIN: 0
CHILLS: 0
BACK PAIN: 0

## 2019-04-17 NOTE — PROGRESS NOTES
Subjective:      Johanna Mata is a 24 y.o. female who presents with UTI (x4 days, bladder pressure, burning sensation at the end, odor)            UTI   This is a new problem. The current episode started in the past 7 days. The problem occurs constantly. Associated symptoms include urinary symptoms. Pertinent negatives include no chest pain, chills, coughing or fever. Nothing aggravates the symptoms. She has tried nothing for the symptoms.       Review of Systems   Constitutional: Negative for chills and fever.   Respiratory: Negative for cough and shortness of breath.    Cardiovascular: Negative for chest pain and palpitations.   Genitourinary: Positive for dysuria, frequency and urgency. Negative for flank pain and hematuria.        Vaginal discharge   Musculoskeletal: Negative for back pain.   All other systems reviewed and are negative.    PMH:  has no past medical history on file.  MEDS:   Current Outpatient Prescriptions:   •  nitrofurantoin monohyd macro (MACROBID) 100 MG Cap, Take 1 Cap by mouth every 12 hours for 5 days., Disp: 10 Cap, Rfl: 0  •  fluconazole (DIFLUCAN) 150 MG tablet, Take 1 tablet.  Repeat in 72 hours if symptoms do not resolve., Disp: 2 Tab, Rfl: 0  •  sertraline (ZOLOFT) 100 MG Tab, Take 1 Tab by mouth every day., Disp: 30 Tab, Rfl: 2  •  amoxicillin (AMOXIL) 500 MG Cap, Take 1 Cap by mouth 3 times a day., Disp: 21 Cap, Rfl: 0  •  hydrOXYzine HCl (ATARAX) 50 MG Tab, Take 1 Tab by mouth every bedtime., Disp: 30 Tab, Rfl: 1  •  polyethylene glycol/lytes (MIRALAX) Pack, Take 1 Packet by mouth every day., Disp: 30 Each, Rfl: 5  ALLERGIES: No Known Allergies  SURGHX:   Past Surgical History:   Procedure Laterality Date   • GYN SURGERY  5/10/2014    Performed by Mindi Germain M.D. at [facility]     SOCHX:  reports that she has been smoking Cigarettes.  She has a 2.50 pack-year smoking history. She has never used smokeless tobacco. She reports that she drinks about 4.8 oz of  "alcohol per week . She reports that she does not use drugs.  FH: Family history was reviewed, no pertinent findings to report  Medications, Allergies, and current problem list reviewed today in Epic     Objective:     /64   Pulse 73   Temp 37.4 °C (99.3 °F) (Temporal)   Resp 16   Ht 1.651 m (5' 5\")   Wt 52.2 kg (115 lb)   SpO2 99%   BMI 19.14 kg/m²      Physical Exam   Constitutional: She is oriented to person, place, and time. She appears well-developed and well-nourished.   HENT:   Head: Normocephalic and atraumatic.   Right Ear: External ear normal.   Left Ear: External ear normal.   Nose: Nose normal.   Mouth/Throat: Oropharynx is clear and moist.   Neck: Normal range of motion. Neck supple.   Cardiovascular: Normal rate, regular rhythm and normal heart sounds.    Pulmonary/Chest: Effort normal and breath sounds normal.   Abdominal: Soft.   Musculoskeletal: She exhibits no tenderness.   No CVA tenderness present.   Neurological: She is alert and oriented to person, place, and time.   Skin: Skin is warm and dry.   Psychiatric: She has a normal mood and affect. Her behavior is normal. Judgment and thought content normal.   Vitals reviewed.              Assessment/Plan:   Patient is a 24-year-old female who presents with UTI-like symptoms for 7 days and white cottage cheese like discharge.  Vitals normal.  No symptoms of pyelonephritis.  Her symptoms are likely yeast infection with a UTI.    1. Acute cystitis without hematuria    - POCT Urinalysis  - Urine Culture; Future  - nitrofurantoin monohyd macro (MACROBID) 100 MG Cap; Take 1 Cap by mouth every 12 hours for 5 days.  Dispense: 10 Cap; Refill: 0    2. Yeast infection    - fluconazole (DIFLUCAN) 150 MG tablet; Take 1 tablet.  Repeat in 72 hours if symptoms do not resolve.  Dispense: 2 Tab; Refill: 0    Differential diagnosis, natural history, supportive care discussed. Follow-up with primary care provider within 7-10 days, emergency room " precautions discussed.  Patient and/or family appears understanding of information.  Handout and review of patients diagnosis and treatment was discussed extensively.

## 2019-04-23 DIAGNOSIS — N30.00 ACUTE CYSTITIS WITHOUT HEMATURIA: ICD-10-CM

## 2019-06-12 ENCOUNTER — OFFICE VISIT (OUTPATIENT)
Dept: INTERNAL MEDICINE | Facility: MEDICAL CENTER | Age: 24
End: 2019-06-12
Payer: MEDICAID

## 2019-06-12 VITALS
BODY MASS INDEX: 22.99 KG/M2 | DIASTOLIC BLOOD PRESSURE: 60 MMHG | OXYGEN SATURATION: 96 % | HEIGHT: 65 IN | HEART RATE: 69 BPM | WEIGHT: 138 LBS | TEMPERATURE: 98.4 F | SYSTOLIC BLOOD PRESSURE: 94 MMHG

## 2019-06-12 DIAGNOSIS — F33.0 MILD EPISODE OF RECURRENT MAJOR DEPRESSIVE DISORDER (HCC): ICD-10-CM

## 2019-06-12 DIAGNOSIS — Z32.00 ENCOUNTER FOR PREGNANCY TEST, RESULT UNKNOWN: ICD-10-CM

## 2019-06-12 DIAGNOSIS — F41.1 GAD (GENERALIZED ANXIETY DISORDER): ICD-10-CM

## 2019-06-12 DIAGNOSIS — Z00.00 HEALTHCARE MAINTENANCE: ICD-10-CM

## 2019-06-12 PROBLEM — F33.9 EPISODE OF RECURRENT MAJOR DEPRESSIVE DISORDER (HCC): Status: ACTIVE | Noted: 2019-06-12

## 2019-06-12 LAB
INT CON NEG: NEGATIVE
INT CON POS: POSITIVE
POC URINE PREGNANCY TEST: NEGATIVE

## 2019-06-12 PROCEDURE — 81025 URINE PREGNANCY TEST: CPT | Mod: GC | Performed by: STUDENT IN AN ORGANIZED HEALTH CARE EDUCATION/TRAINING PROGRAM

## 2019-06-12 PROCEDURE — 99214 OFFICE O/P EST MOD 30 MIN: CPT | Mod: GC | Performed by: INTERNAL MEDICINE

## 2019-06-12 RX ORDER — GABAPENTIN 100 MG/1
100 CAPSULE ORAL 2 TIMES DAILY PRN
COMMUNITY
End: 2019-06-12

## 2019-06-12 ASSESSMENT — ENCOUNTER SYMPTOMS
VOMITING: 0
NAUSEA: 0
SORE THROAT: 0
COUGH: 0
BLURRED VISION: 0
SHORTNESS OF BREATH: 0
DEPRESSION: 1
FEVER: 0
PALPITATIONS: 0
DOUBLE VISION: 0
CHILLS: 0
POLYDIPSIA: 0

## 2019-06-12 NOTE — PROGRESS NOTES
New Patient to Establish    Reason to establish: New patient to establish    CC: Switched insurance and needs a new PCP    HPI:   The patient is a 24 year old female with a history of anxiety and depression. 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     Anxiety/ depression: the patient was diagnosed with anxiety and depression and was seeing Dr. Alicea with [facility]. He had prescribed her Zoloft. She was also getting gabapentin by Dr. García when she was having too much anxiety. She states that the gabapentin was not helping. The patient been taking hydroxyzine, but it makes her drowsy so she isn't taking it during the day. The patient has never had any previous suicide attempts or thoughts of suicide. She is more wondering what her purpose is in life. She is now taking 100mg zoloft every day. She states that it has helped a lot. Her current doctor is a resident here and is graduating from [employer] and she needs a referral to a psychiatrist.     Birth control: The patient started a new birth control and was taking it inconsistently and believes she might be pregnant. Pregnancy test done in clinic today was negative.     Healthcare maintenance:   Pap smear: 1/4/2019, HPV negative due in 2022  Tdap: 5/1/2014    Patient Active Problem List    Diagnosis Date Noted   • DIANNA (generalized anxiety disorder) 06/12/2019   • Episode of recurrent major depressive disorder (HCC) 06/12/2019       No past medical history on file.    Current Outpatient Prescriptions   Medication Sig Dispense Refill   • DESOGESTREL-ETHINYL ESTRADIOL PO Take  by mouth.     • sertraline (ZOLOFT) 100 MG Tab Take 1 Tab by mouth every day. 30 Tab 2   • hydrOXYzine HCl (ATARAX) 50 MG Tab Take 1 Tab by mouth every bedtime. 30 Tab 1     No current facility-administered medications for this visit.        Allergies as of 06/12/2019   • (No Known Allergies)       Social History     Social  History   • Marital status: Single     Spouse name: N/A   • Number of children: N/A   • Years of education: N/A     Occupational History   • [employer] Student   • [employer] - AA business      Social History Main Topics   • Smoking status: Current Every Day Smoker     Packs/day: 0.25     Years: 10.00     Types: Cigarettes   • Smokeless tobacco: Never Used      Comment: 5 cigs a day   • Alcohol use 4.8 - 6.0 oz/week     8 - 10 Shots of liquor per week      Comment: Weekends only   • Drug use: No   • Sexual activity: Yes     Partners: Male     Birth control/ protection: Injection     Other Topics Concern   • Not on file     Social History Narrative   • No narrative on file       Family History   Problem Relation Age of Onset   • Diabetes Maternal Grandmother    • Hypertension Maternal Grandmother    • Cancer Maternal Grandfather 60        colon cancer   • Diabetes Paternal Grandmother    • Heart Disease Paternal Grandfather    • Diabetes Paternal Grandfather 50        fatal MI   • Hyperlipidemia Mother    • Diabetes Mother    • Lung Disease Mother         asthma   • Hypertension Mother    • Hypertension Father    • Lung Disease Brother        Past Surgical History:   Procedure Laterality Date   • GYN SURGERY  5/10/2014    Performed by Mindi Germain M.D. at [facility]       ROS: As per HPI. Additional pertinent symptoms as noted below.    Review of Systems   Constitutional: Negative for chills and fever.   HENT: Negative for congestion and sore throat.    Eyes: Negative for blurred vision and double vision.   Respiratory: Negative for cough and shortness of breath.    Cardiovascular: Negative for chest pain and palpitations.   Gastrointestinal: Negative for nausea and vomiting.   Genitourinary: Negative for dysuria and urgency.   Skin: Negative for itching and rash.   Endo/Heme/Allergies: Negative for environmental allergies and polydipsia.   Psychiatric/Behavioral: Positive for depression. Negative for suicidal  "ideas.         BP (!) 94/60 (BP Location: Left arm, Patient Position: Sitting, BP Cuff Size: Adult)   Pulse 69   Temp 36.9 °C (98.4 °F) (Temporal)   Ht 1.642 m (5' 4.65\")   Wt 62.6 kg (138 lb)   SpO2 96%   BMI 23.22 kg/m²     Physical Exam  General:  Alert and oriented, No apparent distress.    Eyes: Pupils equal and reactive. No scleral icterus.    Throat: Clear no erythema or exudates noted.    Neck: Supple. No lymphadenopathy noted. Thyroid not enlarged.    Lungs: Clear to auscultation and percussion bilaterally.    Cardiovascular: Regular rate and rhythm. No murmurs, rubs or gallops.    Abdomen:  Benign. No rebound or guarding noted.    Extremities: No clubbing, cyanosis, edema.    Skin: Clear. No rash or suspicious skin lesions noted.    Assessment and Plan    Anxiety  Depression  - Chronic, stable  - The patient has had depression and anxiety for many years and has been taking Zoloft currently managed by outpatient psychiatry with resident clinic.   - Sent referral for psychiatry since her current doctor is graduating, but encouraged the patient to continue to try to follow-up with them in the meantime since there is a high demand for psychiatry at the moment.   - Continue Sertraline 100mg daily  - Continue Hydroxyzine as needed nightly for anxiety  - Educated about meditation and breathing techniques  - Follow-up TSH to rule out other causes of depression and anxiety    Birth control  Current every day smoker  - Spent 15 minutes educating the patient on birth control and smoking cessation. The patient agreed to try and stop smoking. She is currently only smoking 5 cigarettes per day.   - Spent 10 minutes talking about birth control and its effectiveness and on making sure to take the pill every day to avoid pregnancy.   - The patient's pregnancy test in clinic today was negative    Healthcare maintenance  Pap smear: 1/4/2019, HPV negative due in 2022  Tdap: 5/1/2014  - CBC, CMP, and TSH " ordered    Followup: Return in about 4 months (around 10/12/2019), or if symptoms worsen or fail to improve, for Long.    Signed by: Valentina Welsh M.D.

## 2019-06-12 NOTE — PATIENT INSTRUCTIONS
Panic Attacks  Panic attacks are sudden, short feelings of great fear or discomfort. You may have them for no reason when you are relaxed, when you are uneasy (anxious), or when you are sleeping.  Follow these instructions at home:  · Take all your medicines as told.  · Check with your doctor before starting new medicines.  · Keep all doctor visits.  Contact a doctor if:  · You are not able to take your medicines as told.  · Your symptoms do not get better.  · Your symptoms get worse.  Get help right away if:  · Your attacks seem different than your normal attacks.  · You have thoughts about hurting yourself or others.  · You take panic attack medicine and you have a side effect.  This information is not intended to replace advice given to you by your health care provider. Make sure you discuss any questions you have with your health care provider.  Document Released: 01/20/2012 Document Revised: 05/25/2017 Document Reviewed: 08/01/2014  [employer] Interactive Patient Education © 2017 [employer]

## 2019-12-30 ENCOUNTER — HOSPITAL ENCOUNTER (OUTPATIENT)
Dept: RADIOLOGY | Facility: MEDICAL CENTER | Age: 24
End: 2019-12-30
Attending: STUDENT IN AN ORGANIZED HEALTH CARE EDUCATION/TRAINING PROGRAM
Payer: MEDICAID

## 2019-12-30 DIAGNOSIS — M25.512 ACUTE PAIN OF LEFT SHOULDER: ICD-10-CM

## 2019-12-30 PROCEDURE — 73030 X-RAY EXAM OF SHOULDER: CPT | Mod: LT

## 2020-01-06 ENCOUNTER — HOSPITAL ENCOUNTER (OUTPATIENT)
Dept: RADIOLOGY | Facility: MEDICAL CENTER | Age: 25
End: 2020-01-06
Attending: STUDENT IN AN ORGANIZED HEALTH CARE EDUCATION/TRAINING PROGRAM
Payer: MEDICAID

## 2020-01-06 DIAGNOSIS — R05.9 COUGH: ICD-10-CM

## 2020-01-06 DIAGNOSIS — R06.00 DYSPNEA, UNSPECIFIED TYPE: ICD-10-CM

## 2020-01-06 PROCEDURE — 71046 X-RAY EXAM CHEST 2 VIEWS: CPT

## 2020-09-01 ENCOUNTER — OFFICE VISIT (OUTPATIENT)
Dept: MEDICAL GROUP | Facility: PHYSICIAN GROUP | Age: 25
End: 2020-09-01
Payer: COMMERCIAL

## 2020-09-01 VITALS
HEART RATE: 80 BPM | BODY MASS INDEX: 22.99 KG/M2 | OXYGEN SATURATION: 97 % | WEIGHT: 138 LBS | HEIGHT: 65 IN | DIASTOLIC BLOOD PRESSURE: 66 MMHG | TEMPERATURE: 98.2 F | SYSTOLIC BLOOD PRESSURE: 118 MMHG | RESPIRATION RATE: 16 BRPM

## 2020-09-01 DIAGNOSIS — F33.42 RECURRENT MAJOR DEPRESSIVE DISORDER, IN FULL REMISSION (HCC): ICD-10-CM

## 2020-09-01 DIAGNOSIS — M79.671 FOOT PAIN, RIGHT: ICD-10-CM

## 2020-09-01 DIAGNOSIS — Z76.89 ESTABLISHING CARE WITH NEW DOCTOR, ENCOUNTER FOR: ICD-10-CM

## 2020-09-01 DIAGNOSIS — F41.1 GENERALIZED ANXIETY DISORDER: ICD-10-CM

## 2020-09-01 DIAGNOSIS — E78.49 OTHER HYPERLIPIDEMIA: ICD-10-CM

## 2020-09-01 PROCEDURE — 99395 PREV VISIT EST AGE 18-39: CPT | Performed by: FAMILY MEDICINE

## 2020-09-01 RX ORDER — ACETAMINOPHEN 500 MG
1000 TABLET ORAL 2 TIMES DAILY
COMMUNITY
End: 2023-09-03

## 2020-09-01 RX ORDER — MEDROXYPROGESTERONE ACETATE 150 MG/ML
INJECTION, SUSPENSION INTRAMUSCULAR
COMMUNITY
Start: 2020-07-12 | End: 2021-10-31

## 2020-09-01 ASSESSMENT — PATIENT HEALTH QUESTIONNAIRE - PHQ9
5. POOR APPETITE OR OVEREATING: SEVERAL DAYS
3. TROUBLE FALLING OR STAYING ASLEEP OR SLEEPING TOO MUCH: NOT AT ALL
7. TROUBLE CONCENTRATING ON THINGS, SUCH AS READING THE NEWSPAPER OR WATCHING TELEVISION: NOT AT ALL
1. LITTLE INTEREST OR PLEASURE IN DOING THINGS: SEVERAL DAYS
6. FEELING BAD ABOUT YOURSELF - OR THAT YOU ARE A FAILURE OR HAVE LET YOURSELF OR YOUR FAMILY DOWN: NOT AL ALL
4. FEELING TIRED OR HAVING LITTLE ENERGY: SEVERAL DAYS
SUM OF ALL RESPONSES TO PHQ9 QUESTIONS 1 AND 2: 1
2. FEELING DOWN, DEPRESSED, IRRITABLE, OR HOPELESS: NOT AT ALL
9. THOUGHTS THAT YOU WOULD BE BETTER OFF DEAD, OR OF HURTING YOURSELF: NOT AT ALL
8. MOVING OR SPEAKING SO SLOWLY THAT OTHER PEOPLE COULD HAVE NOTICED. OR THE OPPOSITE, BEING SO FIGETY OR RESTLESS THAT YOU HAVE BEEN MOVING AROUND A LOT MORE THAN USUAL: NOT AT ALL
SUM OF ALL RESPONSES TO PHQ QUESTIONS 1-9: 3

## 2020-09-01 ASSESSMENT — FIBROSIS 4 INDEX: FIB4 SCORE: 0.38

## 2020-09-01 ASSESSMENT — PAIN SCALES - GENERAL: PAINLEVEL: 3=SLIGHT PAIN

## 2020-09-01 NOTE — LETTER
[facility]  Linda Corral M.D.  [address] [place] [address]  Fax: [phone]   Authorization for Release/Disclosure of   Protected Health Information   Name: JOHANNA MATA : 1995 SSN: [id number]   Address: [address] [place] [address] [address] [place] [address] Phone:    [phone] (home)    I authorize the entity listed below to release/disclose the PHI below to:   [facility]/Linda Corral M.D. and Linda Corral M.D.   Provider or Entity Name:  [facility]   Most recent depo shot date please   Address   City, State, Zip   Phone:      Fax:     Reason for request: continuity of care   Information to be released:    [  ] LAST COLONOSCOPY,  including any PATH REPORT and follow-up  [  ] LAST FIT/COLOGUARD RESULT [  ] LAST DEXA  [  ] LAST MAMMOGRAM  [  ] LAST PAP  [  ] LAST LABS [  ] RETINA EXAM REPORT  [  ] IMMUNIZATION RECORDS  [  ] Release all info      [  ] Check here and initial the line next to each item to release ALL health information INCLUDING  _____ Care and treatment for drug and / or alcohol abuse  _____ HIV testing, infection status, or AIDS  _____ Genetic Testing    DATES OF SERVICE OR TIME PERIOD TO BE DISCLOSED: _____________  I understand and acknowledge that:  * This Authorization may be revoked at any time by you in writing, except if your health information has already been used or disclosed.  * Your health information that will be used or disclosed as a result of you signing this authorization could be re-disclosed by the recipient. If this occurs, your re-disclosed health information may no longer be protected by State or Federal laws.  * You may refuse to sign this Authorization. Your refusal will not affect your ability to obtain treatment.  * This Authorization becomes effective upon signing and will  on (date) __________.      If no date is indicated, this Authorization will  one (1) year from the signature date.    Name: Johanna Mata    Signature:   Date:          9/1/2020       PLEASE FAX REQUESTED RECORDS BACK TO: [phone]

## 2020-09-01 NOTE — PROGRESS NOTES
CC: establish care/annual exam                                                                                                                           Johanna presents today  To establish care.  She was previously under the care of Dr. Valentina Welsh at [facility] and then Dr Aguirre before that.     Right foot pain  Right foot arch pain. Works a a . Noticed it 3 months ago. Has it daily at work.  Walks tiptoes.radiates to right knee.   Tylenol. Worse with walking. Better with rest and soaking it. Only taking tylenol as needed with some improvement. States that she is walking anywhere from 10-12 miles a day.       Depression in remission/ anxiety  Chronic diagnosis.  Stopped zoloft 4 months ago on own.  Took it for a year. Has been doing well without this medication . phq score 3 and darek score 0.     Hyperlipidemia  Chronic medical diagnosis.  Previous labs from December 2018 have total cholesterol 205, triglycerides 96, HDL 78, and . Denies any chest pain or palpitations    Vaccines discussed with patient and thinks she had chickenpox as a child.     Patient Active Problem List    Diagnosis Date Noted   • Cigarette nicotine dependence 09/02/2020   • Foot pain, right 09/01/2020   • Other hyperlipidemia 09/01/2020   • DAREK (generalized anxiety disorder) 06/12/2019   • Recurrent major depressive disorder, in full remission (HCC) 06/12/2019   • Depo-Provera contraceptive status 05/11/2017       Current Outpatient Medications   Medication Sig Dispense Refill   • medroxyPROGESTERone 150 MG/ML Suspension Prefilled Syringe Indications: Pt gets Contraceptive shot every 3 months     • acetaminophen (TYLENOL) 500 MG Tab Take 1,000 mg by mouth 2 times a day. Indications: Pain       No current facility-administered medications for this visit.          Allergies as of 09/01/2020   • (No Known Allergies)          /66 (BP Location: Left arm, Patient Position: Sitting, BP Cuff Size: Small adult)   Pulse 80   Temp  "36.8 °C (98.2 °F) (Temporal)   Resp 16   Ht 1.651 m (5' 5\")   Wt 62.6 kg (138 lb)   SpO2 97%   BMI 22.96 kg/m²     Physical Exam:  Gen:         Alert and oriented, No apparent distress.  Neck:        supple, No Lymphadenopathy  Lungs:     Clear to auscultation bilaterally  CV:          Regular rate and rhythm. no LE edema             Ext:          No clubbing, cyanosis      Assessment and Plan.   25 y.o. female with the following issues.    Johanna was seen today for establish care and foot pain.    Diagnoses and all orders for this visit:    Establishing care with new doctor, encounter for  Patient Counseling:  --Discussed moderation in sodium/caffeine intake, saturated fat and cholesterol, caloric balance, sufficient fresh fruits/vegetables, fiber,   --Discussed brushing, flossing, and dental visits.   --Encouraged regular exercise.   --Discussed tobacco, alcohol, or other drug use; availability of treatment for abuse.   --Injury prevention: Discussed safety belts, safety helmets, smoke detector, etc.    DIANNA (generalized anxiety disorder)  Chronic. Stable. Off meds    Foot pain, right  New problem. Not improving. Ibuprofen prn x 1 week.  -     REFERRAL TO PHYSICAL THERAPY Reason for Therapy: Eval/Treat/Report    Other hyperlipidemia  Chronic. Not well controlled.   -     CBC WITH DIFFERENTIAL; Future  -     Comp Metabolic Panel; Future  -     Lipid Profile; Future    Recurrent major depressive disorder, in full remission (HCC)        Follow up: 6 months.     "

## 2020-09-02 PROBLEM — F17.210 CIGARETTE NICOTINE DEPENDENCE: Status: ACTIVE | Noted: 2020-09-02

## 2021-10-31 ENCOUNTER — OFFICE VISIT (OUTPATIENT)
Dept: URGENT CARE | Facility: PHYSICIAN GROUP | Age: 26
End: 2021-10-31
Payer: COMMERCIAL

## 2021-10-31 ENCOUNTER — HOSPITAL ENCOUNTER (OUTPATIENT)
Dept: RADIOLOGY | Facility: MEDICAL CENTER | Age: 26
End: 2021-10-31
Attending: FAMILY MEDICINE
Payer: COMMERCIAL

## 2021-10-31 VITALS
WEIGHT: 130 LBS | OXYGEN SATURATION: 99 % | HEIGHT: 65 IN | DIASTOLIC BLOOD PRESSURE: 78 MMHG | SYSTOLIC BLOOD PRESSURE: 128 MMHG | TEMPERATURE: 97.3 F | BODY MASS INDEX: 21.66 KG/M2 | RESPIRATION RATE: 16 BRPM | HEART RATE: 91 BPM

## 2021-10-31 DIAGNOSIS — M25.559 HIP PAIN: ICD-10-CM

## 2021-10-31 DIAGNOSIS — Z23 NEED FOR VACCINATION: ICD-10-CM

## 2021-10-31 PROCEDURE — 73502 X-RAY EXAM HIP UNI 2-3 VIEWS: CPT | Mod: RT

## 2021-10-31 PROCEDURE — 90471 IMMUNIZATION ADMIN: CPT | Performed by: FAMILY MEDICINE

## 2021-10-31 PROCEDURE — 90686 IIV4 VACC NO PRSV 0.5 ML IM: CPT | Performed by: FAMILY MEDICINE

## 2021-10-31 PROCEDURE — 99214 OFFICE O/P EST MOD 30 MIN: CPT | Performed by: FAMILY MEDICINE

## 2021-10-31 RX ORDER — NAPROXEN 500 MG/1
500 TABLET ORAL 2 TIMES DAILY WITH MEALS
Qty: 15 TABLET | Refills: 1 | Status: SHIPPED | OUTPATIENT
Start: 2021-10-31 | End: 2022-01-12

## 2021-10-31 ASSESSMENT — ENCOUNTER SYMPTOMS: MYALGIAS: 1

## 2021-10-31 NOTE — PROGRESS NOTES
"Subjective     Johanna Mata is a 26 y.o. female who presents with Leg Pain (R upper leg [id number] )      - This is a pleasant and nontoxic appearing 26 y.o. female with c/o Rt hip pain x 2 weeks. No specific injury. Worse w/ walking/sitting. Better it pushes or light punching over area.       ALLERGIES:  Patient has no known allergies.     PMH:  History reviewed. No pertinent past medical history.     PSH:  Past Surgical History:   Procedure Laterality Date   • GYN SURGERY  5/10/2014    Performed by Mindi Germain M.D. at [facility]       MEDS:    Current Outpatient Medications:   •  acetaminophen (TYLENOL) 500 MG Tab, Take 1,000 mg by mouth 2 times a day. Indications: Pain, Disp: , Rfl:     ** I have documented what I find to be significant in regards to past medical, social, family and surgical history  in my HPI or under PMH/PSH/FH review section, otherwise it is noncontributory **           HPI    Review of Systems   Musculoskeletal: Positive for myalgias.   All other systems reviewed and are negative.             Objective     /78   Pulse 91   Temp 36.3 °C (97.3 °F) (Temporal)   Resp 16   Ht 1.651 m (5' 5\")   Wt 59 kg (130 lb)   SpO2 99%   BMI 21.63 kg/m²      Physical Exam  Vitals and nursing note reviewed.   Constitutional:       General: She is not in acute distress.     Appearance: Normal appearance. She is well-developed.   HENT:      Head: Normocephalic and atraumatic.   Eyes:      General: No scleral icterus.  Cardiovascular:      Heart sounds: Normal heart sounds. No murmur heard.     Pulmonary:      Effort: Pulmonary effort is normal. No respiratory distress.   Musculoskeletal:        Legs:       Comments: Rt hip: good SROM, some pain w/ flexing and IR   Skin:     Coloration: Skin is not jaundiced or pale.   Neurological:      Mental Status: She is alert.      Motor: No abnormal muscle tone.   Psychiatric:         Mood and Affect: Mood normal.         Behavior: " Behavior normal.                     Assessment & Plan          1. Hip pain  DX-HIP-COMPLETE - UNILATERAL 2+ RIGHT    Referral to Orthopedics     Xray: no acute findings by MY READ. RADIOLOGY READ PENDING.     - Dx, plan & d/c instructions discussed   - Rest, stay hydrated, OTC Motrin and/or Tylenol as needed  - E.R. precautions discussed     Asked to kindly follow up with their PCP's office in 2-3 days for a recheck, ER if not improving or feeling/getting worse.    Any realistic side effects of medications that may have been given today reviewed.     Patient left in stable condition     POCT results reviewed/discussed    Today's radiology imaging personally reviewed by me today on day of visit and Radiology readings reviewed and discussed w/ patient today.

## 2021-10-31 NOTE — LETTER
October 31, 2021         Patient: Johanna Mata   YOB: 1995   Date of Visit: 10/31/2021           To Whom it May Concern:    Johanna Mata was seen in my clinic on 10/31/2021. She may return to work this Thursday, excuse any recent missed days work in past week.    If you have any questions or concerns, please don't hesitate to call.        Sincerely,           Aiden Duarte M.D.  Electronically Signed

## 2021-11-28 ENCOUNTER — HOSPITAL ENCOUNTER (OUTPATIENT)
Dept: RADIOLOGY | Facility: MEDICAL CENTER | Age: 26
End: 2021-11-28
Attending: ORTHOPAEDIC SURGERY
Payer: COMMERCIAL

## 2021-11-28 DIAGNOSIS — M25.551 RIGHT HIP PAIN: ICD-10-CM

## 2021-11-28 PROCEDURE — 73721 MRI JNT OF LWR EXTRE W/O DYE: CPT | Mod: RT

## 2022-06-03 PROBLEM — M24.151 ARTICULAR CARTILAGE DISORDER OF HIP, RIGHT: Status: ACTIVE | Noted: 2022-06-03

## 2022-06-24 ENCOUNTER — PHYSICAL THERAPY (OUTPATIENT)
Dept: PHYSICAL THERAPY | Facility: REHABILITATION | Age: 27
End: 2022-06-24
Attending: ORTHOPAEDIC SURGERY
Payer: COMMERCIAL

## 2022-06-24 DIAGNOSIS — S73.191A TEAR OF ACETABULAR LABRUM, RIGHT, INITIAL ENCOUNTER: ICD-10-CM

## 2022-06-24 DIAGNOSIS — M25.551 RIGHT HIP PAIN: ICD-10-CM

## 2022-06-24 PROCEDURE — 97110 THERAPEUTIC EXERCISES: CPT

## 2022-06-24 PROCEDURE — 97161 PT EVAL LOW COMPLEX 20 MIN: CPT

## 2022-06-24 SDOH — ECONOMIC STABILITY: GENERAL: QUALITY OF LIFE: FAIR

## 2022-06-24 ASSESSMENT — ENCOUNTER SYMPTOMS
PAIN SCALE: 2
PAIN SCALE AT HIGHEST: 5
PAIN SCALE AT LOWEST: 1

## 2022-06-24 NOTE — OP THERAPY EVALUATION
Outpatient Physical Therapy  INITIAL EVALUATION    [facility] [place]  [address], [address]  [facility] [address]  Phone:  [phone]  Fax:  [phone]    Date of Evaluation: 2022    Patient: Johanna Mata  YOB: 1995  MRN: [id number]     Referring Provider: Tejinder Solis M.D.  [address] [place] [address]   Referring Diagnosis Right hip pain [M25.551];Tear of right acetabular labrum, initial encounter [S73.191A]     Time Calculation  Start time: 1545  Stop time: 1630 Time Calculation (min): 45 minutes         Chief Complaint: Hip Problem and Post-Op Pain    Visit Diagnoses     ICD-10-CM   1. Right hip pain  M25.551   2. Tear of acetabular labrum, right, initial encounter  S73.191A       Date of onset of impairment: 2022    Subjective:   History of Present Illness:     Date of onset:  10/1/2021  Quality of life:  Fair  Prior level of function:  Ongoing hip pain for 2 years  Pain:     Current pain ratin    At best pain ratin    At worst pain ratin  Activities of Daily Living:     Patient reported ADL status: Limited with ambulation  Limited with hip flexion  Limited with ADL  Limited ability to beatrice clothes   Patient Goals:     Patient goals for therapy:  Increased motion, increased strength and decreased pain    Patient is a 27 y.o. female that presents to therapy status post labral repair, acetabular rim trim, femoral neck osteochondroplasy. States that the surgery was need due to increasing hip pain and failed conservative mangement. Reports the pain quality to be dull, constant and are primarily in the anterior hip. Reports that symptoms now getting better / not changing. Notes minor numbness and tingling along the lateral leg and groin area.   No past medical history on file.  Past Surgical History:   Procedure Laterality Date   • [place] ARTHROSCOPY HIP W/LABRAL REPAIR Right 2022    Procedure: Right hip  arthroscopy, labral repair, acetabular rim trimming, femoral neck osteochondroplasty;  Surgeon: Tejinder Solis M.D.;  Location: [facility];  Service: Orthopedics   • NM ARTHROSCOPY HIP W/FEMOROPLASTY Right 2022    Procedure: OSTEOPLASTY, FEMUR, NECK;  Surgeon: Tejinder Solis M.D.;  Location: [facility];  Service: Orthopedics   • NM ARTHROSCOPY HIP W/ACETABULOPLASTY Right 2022    Procedure: OSTEOPLASTY, ACETABULUM;  Surgeon: Tejinder Solis M.D.;  Location: [facility];  Service: Orthopedics   • GYN SURGERY  5/10/2014    Performed by Mindi Germain M.D. at [facility]     Social History     Tobacco Use   • Smoking status: Former Smoker     Packs/day: 0.25     Years: 10.00     Pack years: 2.50     Types: Cigarettes     Quit date: 10/24/2021     Years since quittin.6   • Smokeless tobacco: Never Used   • Tobacco comment: 5-6 cigs a day   Substance Use Topics   • Alcohol use: Not Currently     Alcohol/week: 4.8 - 6.0 oz     Types: 8 - 10 Shots of liquor per week     Comment: Weekends only     Family and Occupational History     Socioeconomic History   • Marital status: Single     Spouse name: Not on file   • Number of children: Not on file   • Years of education: Not on file   • Highest education level: Not on file   Occupational History   • Occupation: [employer]     Employer: student   • Occupation: [employer]       Objective     Observations     Right Hip  Positive for incision.     Additional Observation Details  No signs of infection    Postural Observations  Seated posture: fair  Standing posture: fair        Neurological Testing     Reflexes   Left   Patellar (L4): normal (2+)  Achilles (S1): normal (2+)  Ankle clonus reflex: negative  Babinski sign: negative    Right   Patellar (L4): normal (2+)  Achilles (S1): normal (2+)  Ankle clonus reflex: negative  Babinski sign:  negative    Myotome testing   Lumbar (left)   L3 (knee extensors): 5  L4 (ankle dorsiflexors): 5  L5 (great toe extension): 5  S1 (ankle plantar flexors): 5    Lumbar (right)   L3 (knee extensors): 3-  L4 (ankle dorsiflexors): 5  L5 (great toe extension): 5    Dermatome testing   Lumbar (left)   All left lumbar dermatomes intact    Lumbar (right)   All right lumbar dermatomes intact    Passive Range of Motion   Left Hip   Flexion: 125 degrees   External rotation (90/90): 66 degrees   Internal rotation (90/90): 12 degrees     Right Hip   Flexion: 90 degrees WFL  External rotation (90/90): 20 degrees     Strength:      Left Knee   Flexion: 4-    Right Knee   Flexion: 3-        Therapeutic Exercises (CPT 09353):     1. Ankle pumps, x20    2. Quad sets, x20    3. Glut sets, x20    4. Edu on precatuions, x5min      Time-based treatments/modalities:    Physical Therapy Timed Treatment Charges  Therapeutic exercise minutes (CPT 70601): 10 minutes      Assessment, Response and Plan:   Impairments: abnormal or restricted ROM, activity intolerance, impaired physical strength and pain with function    Assessment details:  Patient presents with signs and symptoms consistent with a post op condition. Patient limitations include weakness, decreased ROM, and pain. Patient will benefit from skilled therapy to improve the aforementioned deficits and decrease further functional decline.   Prognosis: good    Goals:   Short Term Goals:   1) Patient's quad strength will improve by a half muscle grade to facilitate improved gait.  2) Patient's ROM will improve to 90 to facilitate progression with program.  Short term goal time span:  2-4 weeks      Long Term Goals:    1) Patient's symptoms will improve to allow for ambulation without crutches.  2) Patient's LEFS will improve by 30 to demonstrate functional improvement  Long term goal time span:  6-8 weeks    Plan:   Therapy options:  Physical therapy treatment to continue  Planned  therapy interventions:  E Stim Unattended (CPT 24431), Manual Therapy (CPT 51557), Neuromuscular Re-education (CPT 80291), Therapeutic Exercise (CPT 56652) and Hot or Cold Pack Therapy (CPT 30290)  Frequency:  2x week  Duration in weeks:  12  Discussed with:  Patient      Functional Assessment Used  PT Functional Assessment Tool Used: LEFS  PT Functional Assessment Score: 6     Referring provider co-signature:  I have reviewed this plan of care and my co-signature certifies the need for services.    Certification Period: 06/24/2022 to  09/09/22    Physician Signature: ________________________________ Date: ______________

## 2022-06-29 ENCOUNTER — PHYSICAL THERAPY (OUTPATIENT)
Dept: PHYSICAL THERAPY | Facility: REHABILITATION | Age: 27
End: 2022-06-29
Attending: ORTHOPAEDIC SURGERY
Payer: COMMERCIAL

## 2022-06-29 DIAGNOSIS — S73.191A TEAR OF ACETABULAR LABRUM, RIGHT, INITIAL ENCOUNTER: ICD-10-CM

## 2022-06-29 DIAGNOSIS — M25.551 RIGHT HIP PAIN: ICD-10-CM

## 2022-06-29 DIAGNOSIS — M24.151 DEGENERATIVE TEAR OF ACETABULAR LABRUM OF RIGHT HIP: ICD-10-CM

## 2022-06-29 PROCEDURE — 97110 THERAPEUTIC EXERCISES: CPT

## 2022-06-29 PROCEDURE — 97014 ELECTRIC STIMULATION THERAPY: CPT

## 2022-06-29 PROCEDURE — 97140 MANUAL THERAPY 1/> REGIONS: CPT

## 2022-06-29 NOTE — OP THERAPY DAILY TREATMENT
Outpatient Physical Therapy  DAILY TREATMENT     [facility] [place]  [address], [address]  [facility] [address]  Phone:  [phone]  Fax:  [phone]    Date: 06/29/2022    Patient: Johanna Mata  YOB: 1995  MRN: [id number]     Time Calculation    Start time: 0815  Stop time: 0900 Time Calculation (min): 45 minutes         Chief Complaint: Hip Problem and Post-Op Pain    Visit #: 2    SUBJECTIVE:  Patient reports that she is doing well notes decreasing symptoms.     OBJECTIVE:  Current objective measures:   Able to tolerate upright bike          Therapeutic Exercises (CPT 49155):     1. Ankle pumps, x20    2. Quad sets, x20    3. Glut sets, x20    5. Gait training with crutches, x5min    6. Bike , x10min upright    Therapeutic Treatments and Modalities:     1. E Stim Unattended (CPT 74980), IFC with CP x15min to R hip    2. Manual Therapy (CPT 21504), PROM to limits per protocol    Time-based treatments/modalities:    Physical Therapy Timed Treatment Charges  Manual therapy minutes (CPT 93559): 10 minutes  Therapeutic exercise minutes (CPT 40783): 20 minutes      Pain rating (1-10) before treatment:  1  Pain rating (1-10) after treatment:  0    ASSESSMENT:   Response to treatment: Patient responded well to therapy with an overall decrease in symptoms and tolerance of phase 1 protocol.    PLAN/RECOMMENDATIONS:   Plan for treatment: therapy treatment to continue next visit.  Planned interventions for next visit: continue with current treatment.

## 2022-07-01 ENCOUNTER — PHYSICAL THERAPY (OUTPATIENT)
Dept: PHYSICAL THERAPY | Facility: REHABILITATION | Age: 27
End: 2022-07-01
Attending: FAMILY MEDICINE
Payer: COMMERCIAL

## 2022-07-01 DIAGNOSIS — S73.191A TEAR OF ACETABULAR LABRUM, RIGHT, INITIAL ENCOUNTER: ICD-10-CM

## 2022-07-01 DIAGNOSIS — M25.551 RIGHT HIP PAIN: ICD-10-CM

## 2022-07-01 DIAGNOSIS — M24.151 DEGENERATIVE TEAR OF ACETABULAR LABRUM OF RIGHT HIP: ICD-10-CM

## 2022-07-01 PROCEDURE — 97014 ELECTRIC STIMULATION THERAPY: CPT

## 2022-07-01 PROCEDURE — 97110 THERAPEUTIC EXERCISES: CPT

## 2022-07-02 NOTE — OP THERAPY DAILY TREATMENT
Outpatient Physical Therapy  DAILY TREATMENT     [facility] [place]  [address]  [facility] [address]  Phone:  [phone]  Fax:  [phone]    Date: 07/01/2022    Patient: Johanna Mata  YOB: 1995  MRN: [id number]     Time Calculation    Start time: 1545  Stop time: 1630 Time Calculation (min): 45 minutes         Chief Complaint: Hip Problem and Post-Op Pain    Visit #: 3    SUBJECTIVE:  Patient is reporting only minor soreness.     OBJECTIVE:  Current objective measures:           Therapeutic Exercises (CPT 59319):     1. Ankle pumps, x20    2. Quad sets, x20    3. Glut sets, x20    5. Gait training with crutches, x5min    6. Bike , x10min upright    7. Supine bridge on round, x20    Therapeutic Treatments and Modalities:     1. E Stim Unattended (CPT 13324), IFC with CP x15min to R hip    Time-based treatments/modalities:    Physical Therapy Timed Treatment Charges  Therapeutic exercise minutes (CPT 29364): 30 minutes      Pain rating (1-10) before treatment:  1  Pain rating (1-10) after treatment:  0    ASSESSMENT:   Response to treatment: Patient responded well to therapy with an overall decrease in symptoms and improvement in function. Plan to continue per protocol.     PLAN/RECOMMENDATIONS:   Plan for treatment: therapy treatment to continue next visit.  Planned interventions for next visit: continue with current treatment.

## 2022-07-05 ENCOUNTER — PHYSICAL THERAPY (OUTPATIENT)
Dept: PHYSICAL THERAPY | Facility: REHABILITATION | Age: 27
End: 2022-07-05
Attending: FAMILY MEDICINE
Payer: COMMERCIAL

## 2022-07-05 DIAGNOSIS — M25.551 RIGHT HIP PAIN: ICD-10-CM

## 2022-07-05 DIAGNOSIS — S73.191A TEAR OF ACETABULAR LABRUM, RIGHT, INITIAL ENCOUNTER: ICD-10-CM

## 2022-07-05 PROCEDURE — 97110 THERAPEUTIC EXERCISES: CPT

## 2022-07-05 PROCEDURE — 97014 ELECTRIC STIMULATION THERAPY: CPT

## 2022-07-05 NOTE — OP THERAPY DAILY TREATMENT
Outpatient Physical Therapy  DAILY TREATMENT     [facility] Outpatient Physical Therapy [address], [address]  [facility] [address]  Phone:  [phone]  Fax:  [phone]    Date: 07/05/2022    Patient: Johanna Mata  YOB: 1995  MRN: [id number]     Time Calculation    Start time: 1500  Stop time: 1600 Time Calculation (min): 60 minutes         Chief Complaint: Hip Injury and Post-Op Pain    Visit #: 4    SUBJECTIVE:  States she is using stationary upright bike at home a few times a week.  Doing HEP regularly.  Feeling a little better each week.    OBJECTIVE:        Therapeutic Exercises (CPT 26900):     1. Ankle pumps    2. Quad sets, 5 sec x10    4. Passive supine hip IR/ER, x2 min     5. Prone neutral hip extension stretch, 2 x1 min    6. Bike , x10min upright    7. Supine bridge on round, x20    8. Prone hip IR, x15    9. Hooklying isometric TA ball press, 5 sec x10    10. Supine isometric hip abd and add, 5sec x10    11. Hooklying isometric HS curl into ball, 5sec x10    12. Supine DKC ball roll, x20     13. Hooklying TA bracing with ball OH flexion, x10    Therapeutic Treatments and Modalities:     1. E Stim Unattended (CPT 43193), IFC with CP x15min to R hip    Time-based treatments/modalities:    Physical Therapy Timed Treatment Charges  Therapeutic exercise minutes (CPT 85642): 40 minutes    ASSESSMENT:   Response to treatment: Pt demo good tolerance to mild open chain progressions today.      PLAN/RECOMMENDATIONS:   Plan for treatment: therapy treatment to continue next visit.  Planned interventions for next visit: continue with current treatment.

## 2022-07-07 ENCOUNTER — PHYSICAL THERAPY (OUTPATIENT)
Dept: PHYSICAL THERAPY | Facility: REHABILITATION | Age: 27
End: 2022-07-07
Attending: FAMILY MEDICINE
Payer: COMMERCIAL

## 2022-07-07 DIAGNOSIS — M24.151 DEGENERATIVE TEAR OF ACETABULAR LABRUM OF RIGHT HIP: ICD-10-CM

## 2022-07-07 DIAGNOSIS — S73.191A TEAR OF ACETABULAR LABRUM, RIGHT, INITIAL ENCOUNTER: ICD-10-CM

## 2022-07-07 DIAGNOSIS — M25.551 RIGHT HIP PAIN: ICD-10-CM

## 2022-07-07 PROCEDURE — 97110 THERAPEUTIC EXERCISES: CPT

## 2022-07-07 NOTE — OP THERAPY DAILY TREATMENT
Outpatient Physical Therapy  DAILY TREATMENT     [facility] Out[facility] Physical Therapy [place]  [address], [address]  [facility] [address]  Phone:  [phone]  Fax:  [phone]    Date: 07/07/2022    Patient: Johanna Mata  YOB: 1995  MRN: [id number]     Time Calculation    Start time: 1415  Stop time: 1500 Time Calculation (min): 45 minutes         Chief Complaint: Hip Problem    Visit #: 5    SUBJECTIVE:  Patient reports that symptoms are improving day by day.     OBJECTIVE:  Current objective measures:           Therapeutic Exercises (CPT 80357):     1. Ankle pumps    2. Quad sets, 5 sec x10    4. Passive supine hip IR/ER, x2 min     5. Prone neutral hip extension stretch, 2 x1 min    6. Bike , x10min upright    7. Supine bridge on round, x20    8. Prone hip IR, x15    9. Hooklying isometric TA ball press, 5 sec x10    10. Supine isometric hip abd and add, 5sec x10    11. Hooklying isometric HS curl into ball, 5sec x10    12. Supine DKC ball roll, x20     13. Hooklying TA bracing with ball OH flexion, x10    Therapeutic Treatments and Modalities:     1. E Stim Unattended (CPT 33357), IFC with CP x15min to R hip    Time-based treatments/modalities:    Physical Therapy Timed Treatment Charges  Therapeutic exercise minutes (CPT 22863): 30 minutes      Pain rating (1-10) before treatment:  1  Pain rating (1-10) after treatment:  0    ASSESSMENT:   Response to treatment: Patient continues to tolerate her protocol well with no increase in symptoms.     PLAN/RECOMMENDATIONS:   Plan for treatment: therapy treatment to continue next visit.  Planned interventions for next visit: continue with current treatment.

## 2022-07-08 ENCOUNTER — APPOINTMENT (OUTPATIENT)
Dept: PHYSICAL THERAPY | Facility: REHABILITATION | Age: 27
End: 2022-07-08
Attending: ORTHOPAEDIC SURGERY
Payer: COMMERCIAL

## 2022-07-11 ENCOUNTER — APPOINTMENT (OUTPATIENT)
Dept: PHYSICAL THERAPY | Facility: REHABILITATION | Age: 27
End: 2022-07-11
Attending: ORTHOPAEDIC SURGERY
Payer: COMMERCIAL

## 2022-07-13 ENCOUNTER — PHYSICAL THERAPY (OUTPATIENT)
Dept: PHYSICAL THERAPY | Facility: REHABILITATION | Age: 27
End: 2022-07-13
Attending: FAMILY MEDICINE
Payer: COMMERCIAL

## 2022-07-13 DIAGNOSIS — M25.551 RIGHT HIP PAIN: ICD-10-CM

## 2022-07-13 DIAGNOSIS — S73.191A TEAR OF ACETABULAR LABRUM, RIGHT, INITIAL ENCOUNTER: ICD-10-CM

## 2022-07-13 PROCEDURE — 97110 THERAPEUTIC EXERCISES: CPT

## 2022-07-13 PROCEDURE — 97014 ELECTRIC STIMULATION THERAPY: CPT

## 2022-07-13 NOTE — OP THERAPY DAILY TREATMENT
Outpatient Physical Therapy  DAILY TREATMENT     [facility] Out[facility] Physical Therapy [place]  [address]  [facility] [address]  Phone:  [phone]  Fax:  [phone]    Date: 07/13/2022    Patient: Johanna Mata  YOB: 1995  MRN: [id number]     Time Calculation    Start time: 0815  Stop time: 0900 Time Calculation (min): 45 minutes         Chief Complaint: R hip  Visit #: 6    SUBJECTIVE:  Reports R calf pain for the last couple of days. Has been taking her pain meds to combat it. Overall tightness in front of hip felt as well. not wearing the brace as much lately.      OBJECTIVE:  Current objective measures: hip flexion limited to 90 (no active hip flexion with treatment), hip abduction limited to less than 45 degrees (Precaution) and ER  to 20 degrees (precaution) IR in pain free range.           Therapeutic Exercises (CPT 00305):     1. Bike , lvl 0 x 10min    2. Prone IR/ ER , x 2min     3. Quadruped kickbacks, x 25, hip extension to neutral     4. Hip abduction sliders, x 20 AROM poor control and poor endurance    5. Bridges , hooklying, x 20     6. Hooklying isometric HS curl into ball, 5sec x10    7. Supine isometric hip abd and add, 5sec x10    8. Standing ankle rocker, 2min    9. Calf stretch towel/strap review    Therapeutic Treatments and Modalities:     1. E Stim Unattended (CPT 01871), IFC with CP x15min to R hip    Time-based treatments/modalities:    Physical Therapy Timed Treatment Charges  Therapeutic exercise minutes (CPT 46246): 30 minutes      Pain rating (1-10) before treatment:  1  Pain rating (1-10) after treatment:  0    ASSESSMENT:   Response to treatment: Patient continues to tolerate her protocol well. Calf cramping/ pain secondary to non-use. Facilitation towards more ambulation to occur in coming weeks as protocol and pain allows.     PLAN/RECOMMENDATIONS:   Plan for treatment: therapy treatment to continue next visit.  Planned interventions for  next visit: continue with current treatment.

## 2022-07-15 ENCOUNTER — APPOINTMENT (OUTPATIENT)
Dept: PHYSICAL THERAPY | Facility: REHABILITATION | Age: 27
End: 2022-07-15
Attending: ORTHOPAEDIC SURGERY
Payer: COMMERCIAL

## 2022-07-15 ENCOUNTER — OFFICE VISIT (OUTPATIENT)
Dept: URGENT CARE | Facility: PHYSICIAN GROUP | Age: 27
End: 2022-07-15
Payer: COMMERCIAL

## 2022-07-15 ENCOUNTER — HOSPITAL ENCOUNTER (OUTPATIENT)
Dept: RADIOLOGY | Facility: MEDICAL CENTER | Age: 27
End: 2022-07-15
Attending: PHYSICIAN ASSISTANT
Payer: COMMERCIAL

## 2022-07-15 VITALS
DIASTOLIC BLOOD PRESSURE: 72 MMHG | TEMPERATURE: 98.1 F | OXYGEN SATURATION: 97 % | HEIGHT: 66 IN | BODY MASS INDEX: 20.09 KG/M2 | WEIGHT: 125 LBS | HEART RATE: 102 BPM | RESPIRATION RATE: 16 BRPM | SYSTOLIC BLOOD PRESSURE: 120 MMHG

## 2022-07-15 DIAGNOSIS — Z98.890 STATUS POST ARTHROSCOPY OF HIP: ICD-10-CM

## 2022-07-15 DIAGNOSIS — M79.89 RIGHT LEG SWELLING: ICD-10-CM

## 2022-07-15 PROBLEM — F17.210 CIGARETTE NICOTINE DEPENDENCE: Status: RESOLVED | Noted: 2020-09-02 | Resolved: 2022-07-15

## 2022-07-15 PROCEDURE — 93971 EXTREMITY STUDY: CPT | Mod: RT

## 2022-07-15 PROCEDURE — 99213 OFFICE O/P EST LOW 20 MIN: CPT | Performed by: PHYSICIAN ASSISTANT

## 2022-07-15 ASSESSMENT — ENCOUNTER SYMPTOMS
CHILLS: 0
FEVER: 0
VOMITING: 0
SHORTNESS OF BREATH: 0
LEG SWELLING: 1
NAUSEA: 0

## 2022-07-15 NOTE — PROGRESS NOTES
"Subjective:   Johanna Mata  is a 27 y.o. female who presents for Leg Swelling (X 1 week. Having swelling in right leg and PT had told her to go straight to urgent care. Experiencing sharp pains throughout the entire leg. )      Leg Swelling  Pertinent negatives include no chest pain, chills, fever, nausea, rash or vomiting.     Patient is approximately 1 month status post right hip arthroscopy with labral repair and osteochondral plasty.  Over the last 1 week he has had pain in right leg as well as swelling.  Physical therapist recommended urgent care evaluation.  Patient denies personal or family history of DVT or PE.  Patient denies chest pain or shortness of breath.  Patient denies use of tobacco products or oral birth control.  Patient notes mild achy discomfort to calf with deep palpation as well as rising up on toes.  She is ambulatory with crutches and partial weightbearing on right lower extremity.  She did use aspirin for 2 weeks after surgery.  She did just recently fly to [place] last weekend.  She states she was somewhat ambulatory but also used a wheelchair through the airport.     Review of Systems   Constitutional: Negative for chills and fever.   Respiratory: Negative for shortness of breath.    Cardiovascular: Positive for leg swelling. Negative for chest pain.   Gastrointestinal: Negative for nausea and vomiting.   Skin: Negative for rash.       No Known Allergies     Objective:   /72 (BP Location: Right arm, Patient Position: Sitting, BP Cuff Size: Adult)   Pulse (!) 102   Temp 36.7 °C (98.1 °F) (Temporal)   Resp 16   Ht 1.676 m (5' 6\")   Wt 56.7 kg (125 lb)   SpO2 97%   BMI 20.18 kg/m²     Physical Exam  Vitals and nursing note reviewed.   Constitutional:       General: She is not in acute distress.     Appearance: She is well-developed. She is not diaphoretic.   HENT:      Head: Normocephalic and atraumatic.      Right Ear: External ear normal.      Left Ear: " External ear normal.      Nose: Nose normal.   Eyes:      General: Lids are normal. No scleral icterus.        Right eye: No discharge.         Left eye: No discharge.      Conjunctiva/sclera: Conjunctivae normal.   Pulmonary:      Effort: Pulmonary effort is normal. No respiratory distress.   Musculoskeletal:         General: Normal range of motion.      Cervical back: Neck supple.      Right lower leg: Swelling ( 1.5cm R>L calf circum @ 22cm above med malleolus) present. No edema ( no pitting edema).      Left lower leg: No swelling. No edema.      Comments: Mild ttp over    Skin:     General: Skin is warm and dry.      Coloration: Skin is not pale.      Findings: No erythema.   Neurological:      Mental Status: She is alert and oriented to person, place, and time. She is not disoriented.   Psychiatric:         Speech: Speech normal.         Behavior: Behavior normal.          Well Criteria for predicting DVT:   0 = Active cancer   0 = Recent LE immobilization or casting/paralysis of LE   1 = Recently bedridden or major surgery (more than 3 days, or within 4wks)   1 = Localized tenderness over deep venous system   0 = Entire leg swollen  0 = Calf swelling >3cm compared to contralateral  0 = Pitting edema   0 = Collateral superficial veins (nonvaricose)      If an alternative dx as likely or more likely then DVT = (subtract) -2  Total score = 2    US LE DVT r/o -    FINDINGS:   Right lower extremity.    All veins demonstrate complete color filling and compressibility with    normal venous flow dynamics including spontaneous flow and respiratory    phasicity.    No deep venous thrombosis.    Flow was evaluated in the contralateral common femoral vein and normal    venous flow dynamics including spontaneous flow and respiratory phasic    variation were demonstrated.       Rufus Chavez MD   (Electronically Signed)   Final Date:      15 July 2022                     16:45             Specimen Collected: 07/15/22   3:06 PM Last Resulted: 07/15/22  4:45 PM           Assessment/Plan:   1. Right leg swelling  - US-EXTREMITY VENOUS LOWER UNILAT RIGHT; Future    2. Status post arthroscopy of hip    Negative ultrasound for DVT.  Continue follow-up with physical therapy and orthopedics.    I have worn an N95 mask, gloves and eye protection for the entire encounter with this patient.     Differential diagnosis, natural history, supportive care, and indications for immediate follow-up discussed.

## 2022-07-18 ENCOUNTER — APPOINTMENT (OUTPATIENT)
Dept: PHYSICAL THERAPY | Facility: REHABILITATION | Age: 27
End: 2022-07-18
Attending: FAMILY MEDICINE
Payer: COMMERCIAL

## 2022-07-19 ENCOUNTER — PHYSICAL THERAPY (OUTPATIENT)
Dept: PHYSICAL THERAPY | Facility: REHABILITATION | Age: 27
End: 2022-07-19
Attending: FAMILY MEDICINE
Payer: COMMERCIAL

## 2022-07-19 DIAGNOSIS — S73.191A TEAR OF ACETABULAR LABRUM, RIGHT, INITIAL ENCOUNTER: ICD-10-CM

## 2022-07-19 DIAGNOSIS — M25.551 RIGHT HIP PAIN: ICD-10-CM

## 2022-07-19 DIAGNOSIS — M24.151 DEGENERATIVE TEAR OF ACETABULAR LABRUM OF RIGHT HIP: ICD-10-CM

## 2022-07-19 PROCEDURE — 97140 MANUAL THERAPY 1/> REGIONS: CPT

## 2022-07-19 PROCEDURE — 97110 THERAPEUTIC EXERCISES: CPT

## 2022-07-19 PROCEDURE — 97014 ELECTRIC STIMULATION THERAPY: CPT

## 2022-07-19 NOTE — OP THERAPY DAILY TREATMENT
Outpatient Physical Therapy  DAILY TREATMENT     [facility] [place]  [address], [address]  [place] [address]  Phone:  [phone]  Fax:  [phone]    Date: 07/19/2022    Patient: Johanna Mata  YOB: 1995  MRN: [id number]     Time Calculation    Start time: 0945  Stop time: 1030 Time Calculation (min): 45 minutes         Chief Complaint: Hip Problem and Post-Op Pain    Visit #: 7    SUBJECTIVE:  Patient reports that she is feeling a little better. States that symptoms are easing.     OBJECTIVE:  Current objective measures:           Therapeutic Exercises (CPT 43417):     1. Bike , lvl 0 x 10min    2. Prone , x 2min     3. Quadruped kickbacks, NT    4. Hip abduction sliders, x 20 AROM poor control and poor endurance    5. Bridges , hooklying, x 20     6. Hooklying isometric HS curl into ball, 5sec x10    7. Supine isometric hip abd and add, 5sec x10    8. Standing ankle rocker, NT    9. Calf stretch towel/strap review, NT    Therapeutic Treatments and Modalities:     1. E Stim Unattended (CPT 15264), IFC with CP x15min to R hip    2. Manual Therapy (CPT 06078), PROM to R hip ; STM to quad and psoas    Time-based treatments/modalities:    Physical Therapy Timed Treatment Charges  Manual therapy minutes (CPT 39405): 10 minutes  Therapeutic exercise minutes (CPT 34878): 20 minutes      Pain rating (1-10) before treatment:  3  Pain rating (1-10) after treatment:  0    ASSESSMENT:   Response to treatment: Patient responded well to therapy with an overall decrease in symptoms. Plan to continue with current POC of rest.     PLAN/RECOMMENDATIONS:   Plan for treatment: therapy treatment to continue next visit.  Planned interventions for next visit: continue with current treatment.

## 2022-07-19 NOTE — OP THERAPY PROGRESS SUMMARY
Outpatient Physical Therapy  PROGRESS SUMMARY NOTE      [facility] [place]  [address]  [place] [address]  Phone:  [phone]  Fax:  [phone]    Date of Visit: 07/19/2022    Patient: Johanna Mata  YOB: 1995  MRN: [id number]     Referring Provider: Tejinder Solis M.D.  [address] [place] [address]   Referring Diagnosis Pain in right hip [M25.551];Other sprain of right hip, initial encounter [S73.191A]     Visit Diagnoses     ICD-10-CM   1. Right hip pain  M25.551   2. Tear of acetabular labrum, right, initial encounter  S73.191A   3. 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 Patient has demonstrated overall improvement up until the last week. She noted an increase in activity and a subsequent increase in pain. Exercise has been decreased to allow for rest, as well as activity modification. Patient is now responding well and expect to be back on protocol in 1-2 weeks. Patient to follow up with MD.     Objective findings and assessment details: hip flexion limited to 90 (no active hip flexion with treatment), hip abduction limited to less than 45 degrees (Precaution) and ER  to 20 degrees (precaution) IR in pain free range.     Goals:   Short Term Goals:   1) Patient's quad strength will improve by a half muscle grade to facilitate improved gait. Not met  2) Patient's ROM will improve to 90 to facilitate progression with program. MET  Short term goal time span:  2-4 weeks      Long Term Goals:    1) Patient's symptoms will improve to allow for ambulation without crutches. Not met  2) Patient's LEFS will improve by 30 to demonstrate functional improvement not met  Long term goal time span:  6-8 weeks    Plan:   Planned therapy  interventions:  Hot or Cold Pack Therapy (CPT 98315), E Stim Unattended (CPT 17726), Manual Therapy (CPT 33087), Neuromuscular Re-education (CPT 65680), Gait Training (CPT 23237) and Therapeutic Exercise (CPT 55362)  Frequency:  2x week  Duration in weeks:  8      Referring provider co-signature:  I have reviewed this plan of care and my co-signature certifies the need for services.     Certification Period: 07/19/2022 to 09/13/22    Physician Signature: ________________________________ Date: ______________

## 2022-07-21 ENCOUNTER — PHYSICAL THERAPY (OUTPATIENT)
Dept: PHYSICAL THERAPY | Facility: REHABILITATION | Age: 27
End: 2022-07-21
Attending: FAMILY MEDICINE
Payer: COMMERCIAL

## 2022-07-21 DIAGNOSIS — S73.191A TEAR OF ACETABULAR LABRUM, RIGHT, INITIAL ENCOUNTER: ICD-10-CM

## 2022-07-21 DIAGNOSIS — M24.151 DEGENERATIVE TEAR OF ACETABULAR LABRUM OF RIGHT HIP: ICD-10-CM

## 2022-07-21 DIAGNOSIS — M25.551 RIGHT HIP PAIN: ICD-10-CM

## 2022-07-21 PROCEDURE — 97110 THERAPEUTIC EXERCISES: CPT

## 2022-07-21 PROCEDURE — 97014 ELECTRIC STIMULATION THERAPY: CPT

## 2022-07-21 NOTE — OP THERAPY DAILY TREATMENT
Outpatient Physical Therapy  DAILY TREATMENT     [facility] [place]  [address]  [place] [address]  Phone:  [phone]  Fax:  [phone]    Date: 07/21/2022    Patient: Johanna Mata  YOB: 1995  MRN: [id number]     Time Calculation    Start time: 0730  Stop time: 0815 Time Calculation (min): 45 minutes         Chief Complaint: Hip Problem and Post-Op Pain    Visit #: 8    SUBJECTIVE:  Patient continues to report soreness in the hip. States it constantly feels tired.     OBJECTIVE:  Current objective measures:           Therapeutic Exercises (CPT 02270):     1. Ankle pumps    2. Quad sets, 5 sec x10    4. Passive supine hip IR/ER, x2 min     5. Prone neutral hip extension stretch, 2 x1 min    6. Bike , x10min upright    7. Supine bridge on round, x20    8. Prone hip IR, x15    9. Hooklying isometric TA ball press, 5 sec x10    10. Supine isometric hip abd and add, 5sec x10    11. Hooklying isometric HS curl into ball, 5sec x10    12. Supine DKC ball roll, x20     13. Hooklying TA bracing with ball OH flexion, x10    Therapeutic Treatments and Modalities:     1. E Stim Unattended (CPT 62636), IFC with CP x15min to R hip    Time-based treatments/modalities:    Physical Therapy Timed Treatment Charges  Therapeutic exercise minutes (CPT 34640): 30 minutes      Pain rating (1-10) before treatment:  3  Pain rating (1-10) after treatment:  1    ASSESSMENT:   Response to treatment: Patient responded well to a decrease in exercises with less complaints of pain. Plan to continue to proceed per protocol and MD clearance.     PLAN/RECOMMENDATIONS:   Plan for treatment: therapy treatment to continue next visit.  Planned interventions for next visit: continue with current treatment.

## 2022-07-26 ENCOUNTER — APPOINTMENT (OUTPATIENT)
Dept: PHYSICAL THERAPY | Facility: REHABILITATION | Age: 27
End: 2022-07-26
Attending: FAMILY MEDICINE
Payer: COMMERCIAL

## 2022-07-26 NOTE — OP THERAPY DAILY TREATMENT
Outpatient Physical Therapy  DAILY TREATMENT     [facility] [place]  [address]  [place] [address]  Phone:  [phone]  Fax:  [phone]    Date: 07/26/2022    Patient: Johanna Mata  YOB: 1995  MRN: [id number]     Time Calculation                   Chief Complaint: No chief complaint on file.    Visit #: 9    SUBJECTIVE:  Patient continues to report soreness in the hip. States it constantly feels tired.     OBJECTIVE:  Current objective measures:           Therapeutic Exercises (CPT 85278):     1. Ankle pumps    2. Quad sets, 5 sec x10    4. Passive supine hip IR/ER, x2 min     5. Prone neutral hip extension stretch, 2 x1 min    6. Bike , x10min upright    7. Supine bridge on round, x20    8. Prone hip IR, x15    9. Hooklying isometric TA ball press, 5 sec x10    10. Supine isometric hip abd and add, 5sec x10    11. Hooklying isometric HS curl into ball, 5sec x10    12. Supine DKC ball roll, x20     13. Hooklying TA bracing with ball OH flexion, x10    Therapeutic Treatments and Modalities:     1. E Stim Unattended (CPT 20957), IFC with CP x15min to R hip    Time-based treatments/modalities:           Pain rating (1-10) before treatment:  3  Pain rating (1-10) after treatment:  1    ASSESSMENT:   Response to treatment: Patient responded well to a decrease in exercises with less complaints of pain. Plan to continue to proceed per protocol and MD clearance.     PLAN/RECOMMENDATIONS:   Plan for treatment: therapy treatment to continue next visit.  Planned interventions for next visit: continue with current treatment.

## 2022-07-28 ENCOUNTER — APPOINTMENT (OUTPATIENT)
Dept: PHYSICAL THERAPY | Facility: REHABILITATION | Age: 27
End: 2022-07-28
Attending: FAMILY MEDICINE
Payer: COMMERCIAL

## 2022-08-02 ENCOUNTER — PHYSICAL THERAPY (OUTPATIENT)
Dept: PHYSICAL THERAPY | Facility: REHABILITATION | Age: 27
End: 2022-08-02
Attending: ORTHOPAEDIC SURGERY
Payer: COMMERCIAL

## 2022-08-02 DIAGNOSIS — S73.191A TEAR OF ACETABULAR LABRUM, RIGHT, INITIAL ENCOUNTER: ICD-10-CM

## 2022-08-02 DIAGNOSIS — M24.151 DEGENERATIVE TEAR OF ACETABULAR LABRUM OF RIGHT HIP: ICD-10-CM

## 2022-08-02 DIAGNOSIS — M25.551 RIGHT HIP PAIN: ICD-10-CM

## 2022-08-02 PROCEDURE — 97110 THERAPEUTIC EXERCISES: CPT

## 2022-08-03 NOTE — OP THERAPY DAILY TREATMENT
Outpatient Physical Therapy  DAILY TREATMENT     [facility] [place]  [address]  [place] [address]  Phone:  [phone]  Fax:  [phone]    Date: 08/02/2022    Patient: Johanna Mata  YOB: 1995  MRN: [id number]     Time Calculation    Start time: 1330  Stop time: 1415 Time Calculation (min): 45 minutes         Chief Complaint: Hip Problem and Post-Op Pain    Visit #: 9    SUBJECTIVE:  Patient reports overall decreased pain.     OBJECTIVE:  Current objective measures:           Therapeutic Exercises (CPT 10201):     4. Passive supine hip IR/ER, x2 min     5. Prone neutral hip extension stretch, 2 x1 min    6. Bike , x10min upright    7. Supine bridge on round, x20    8. Prone hip IR, x15    9. Hooklying isometric TA ball press, 5 sec x10    11. Hooklying isometric HS curl into ball, 5sec x10    12. Supine DKC ball roll, x20     13. Hooklying TA bracing with ball OH flexion, x10    14. Stool ER/IR, x20    15. Glut step, x20    16. Gait training no a.d., x15min    Therapeutic Treatments and Modalities: , Ice to hip post exercise    Time-based treatments/modalities:    Physical Therapy Timed Treatment Charges  Therapeutic exercise minutes (CPT 20357): 40 minutes      Pain rating (1-10) before treatment:  1  Pain rating (1-10) after treatment:  1    ASSESSMENT:   Response to treatment: Patient responded well to therapy with an overall increase in fatigue with no increase in pain. Consider NMES to glut on midstance.     PLAN/RECOMMENDATIONS:   Plan for treatment: therapy treatment to continue next visit.  Planned interventions for next visit: continue with current treatment.

## 2022-08-04 ENCOUNTER — PHYSICAL THERAPY (OUTPATIENT)
Dept: PHYSICAL THERAPY | Facility: REHABILITATION | Age: 27
End: 2022-08-04
Attending: ORTHOPAEDIC SURGERY
Payer: COMMERCIAL

## 2022-08-04 DIAGNOSIS — M24.151 DEGENERATIVE TEAR OF ACETABULAR LABRUM OF RIGHT HIP: ICD-10-CM

## 2022-08-04 DIAGNOSIS — S73.191A TEAR OF ACETABULAR LABRUM, RIGHT, INITIAL ENCOUNTER: ICD-10-CM

## 2022-08-04 DIAGNOSIS — M25.551 RIGHT HIP PAIN: ICD-10-CM

## 2022-08-04 PROCEDURE — 97110 THERAPEUTIC EXERCISES: CPT

## 2022-08-04 NOTE — OP THERAPY DAILY TREATMENT
Outpatient Physical Therapy  DAILY TREATMENT     [facility] [place]  [address]  [place] [address]  Phone:  [phone]  Fax:  [phone]    Date: 08/04/2022    Patient: Johanna Mata  YOB: 1995  MRN: [id number]     Time Calculation    Start time: 1330  Stop time: 1415 Time Calculation (min): 45 minutes         Chief Complaint: Hip Problem    Visit #: 10    SUBJECTIVE:  Patient reports that she is doing ok. She was sore after last visit. States overall symptoms are improving.     OBJECTIVE:  Current objective measures:   Hip flexion 90deg  Hip ER: 54deg  Hip IR: 10deg  Hip ext: 25deg          Therapeutic Exercises (CPT 48331):     4. Passive supine hip IR/ER, x2 min     5. Prone neutral hip extension stretch, 2 x1 min    6. Bike , x10min upright    7. Supine bridge on round, x20    8. Prone hip IR, x15    9. Hooklying isometric TA ball press, 5 sec x10    11. Hooklying isometric HS curl into ball, 5sec x10    12. Supine DKC ball roll, x20     13. Hooklying TA bracing with ball OH flexion, x10    14. Stool ER/IR, x20    15. Glut step, x20    16. Gait training no a.d., x15min    Therapeutic Treatments and Modalities: , IFC to hip with CP 80-150hz x15min     Time-based treatments/modalities:    Physical Therapy Timed Treatment Charges  Therapeutic exercise minutes (CPT 72059): 30 minutes      Pain rating (1-10) before treatment:  2  Pain rating (1-10) after treatment:  0    ASSESSMENT:   Response to treatment: Patient is responding well to therapy with an overall improvement in ROM and strength.     PLAN/RECOMMENDATIONS:   Plan for treatment: therapy treatment to continue next visit.  Planned interventions for next visit: continue with current treatment.

## 2022-08-05 NOTE — OP THERAPY PROGRESS SUMMARY
Outpatient Physical Therapy  PROGRESS SUMMARY NOTE      [facility] [place]  [address]  [place] [address]  Phone:  [phone]  Fax:  [phone]    Date of Visit: 08/04/2022    Patient: Johnana Mata  YOB: 1995  MRN: [id number]     Referring Provider: Tejinder Solis M.D.  [address] [place] [address]   Referring Diagnosis Pain in right hip [M25.551];Other sprain of right hip, initial encounter [S73.191A]     Visit Diagnoses     ICD-10-CM   1. Right hip pain  M25.551   2. Tear of acetabular labrum, right, initial encounter  S73.191A   3. 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 Patient is making good progress with her post op protocol. Patient is now off her a.d. and is demonstrating good gait and improving glut control. Patient is still limited with internal rotation. External rotation is improving. Pain is controled. Plan to continue per post op protocol.     Objective findings and assessment details: Hip flexion 90deg  Hip ER: 54deg  Hip IR: 10deg  Hip ext: 25deg    Goals:   Short Term Goals:   1) Patient's quad strength will improve by a half muscle grade to facilitate improved gait.  2) Patient's ROM will improve to 90 to facilitate progression with program.  Short term goal time span:  2-4 weeks      Long Term Goals:    1) Patient's symptoms will improve to allow for ambulation without crutches.  2) Patient's LEFS will improve by 30 to demonstrate functional improvement  Long term goal time span:  6-8 weeks    Plan:   Planned therapy interventions:  E Stim Unattended (CPT 13854), Hot or Cold Pack Therapy (CPT 97144),  Gait Training (CPT 60102), Manual Therapy (CPT 32219), Neuromuscular Re-education (CPT 35701) and Therapeutic Exercise (CPT 60920)  Frequency:  2x week  Duration in weeks:  8      Referring provider co-signature:  I have reviewed this plan of care and my co-signature certifies the need for services.     Certification Period: 08/04/2022 to 09/30/22    Physician Signature: ________________________________ Date: ______________

## 2022-08-11 ENCOUNTER — PHYSICAL THERAPY (OUTPATIENT)
Dept: PHYSICAL THERAPY | Facility: REHABILITATION | Age: 27
End: 2022-08-11
Attending: ORTHOPAEDIC SURGERY
Payer: COMMERCIAL

## 2022-08-11 DIAGNOSIS — M25.551 RIGHT HIP PAIN: ICD-10-CM

## 2022-08-11 PROCEDURE — 97110 THERAPEUTIC EXERCISES: CPT

## 2022-08-11 NOTE — OP THERAPY DAILY TREATMENT
"  Outpatient Physical Therapy  DAILY TREATMENT     [facility] [place]  [address]  [place] [address]  Phone:  [phone]  Fax:  [phone]    Date: 08/11/2022    Patient: Johanna Mata  YOB: 1995  MRN: [id number]     Time Calculation    Start time: 1416  Stop time: 1458 Time Calculation (min): 42 minutes         Chief Complaint: Hip Problem    Visit #: 11    SUBJECTIVE:  Patient reports no new complaints since last treatment session. States that she has appointment with Dr. Solis tomorrow. Feels like the \"love handle\" of her right side is bigger; uncertain if this is swelling or if she has compensated and it has altered her alignment. Has not used crutches since last treatment session with adverse effects other than some soreness during the evening. Patient is concerned about returning to work in one month as she continues to experience pain and soreness. Denied benefits to [employer] at this time.     OBJECTIVE:  Current objective measures:   8 weeks s/p R hip arthroscopic labral repair           Therapeutic Exercises (CPT 91608):     1. Upright bike, L5 x 10 minutes    2. Seated hamstring stretch, x 2 minutes, Education to maintain 90 degree hip flexion precaution. Added to HEP    3. prone quadruped/neutral hip extension stretch, x 2 minutes, Minimal stretch felt in this position.    4. Assessment of pelvic alignment, Noted level ASIS and iliac crest, R medial malleolus possibly 1/4\" shorter than L. Discussed that this could be due to muscle guarding. That the larger \"love handle\" could be her standard anatomy or mild inflammation of the hip. Will continue to monitor but no action taken at this time.    8. prone hip IR, x15    9. Clamshells, 2 x 15, Added to HEP    10. hooklying isometric HS curl into ball, 2 x 10, 5 second holds    11. Seated hip ER, 2 x 10, No resistance. Added to HEP    14. Stool ER/IR, 2 x 20    Therapeutic Treatments and " Modalities:     1. Gait Training (CPT 81463), Assessment of gait without AD, Noted equal stride length, good heel strike, normal weight shifting, but lacked trunk rotation and was keeping arms stiff at sides. Education provided and patient able to immediately incorporate.  Time-based treatments/modalities:    Physical Therapy Timed Treatment Charges  Gait training minutes (CPT 11358): 5 minutes  Therapeutic exercise minutes (CPT 34813): 37 minutes      ASSESSMENT:   Response to treatment: Progressed R hip ER strengthening but maintained remainder of program the same due to prior flare ups. Will assess tolerance to new exercises next session.     PLAN/RECOMMENDATIONS:   Plan for treatment: therapy treatment to continue next visit.  Planned interventions for next visit: continue with current treatment.

## 2022-08-15 ENCOUNTER — TELEPHONE (OUTPATIENT)
Dept: SCHEDULING | Facility: IMAGING CENTER | Age: 27
End: 2022-08-15

## 2022-08-16 ENCOUNTER — PHYSICAL THERAPY (OUTPATIENT)
Dept: PHYSICAL THERAPY | Facility: REHABILITATION | Age: 27
End: 2022-08-16
Attending: ORTHOPAEDIC SURGERY
Payer: COMMERCIAL

## 2022-08-16 DIAGNOSIS — S73.191A TEAR OF ACETABULAR LABRUM, RIGHT, INITIAL ENCOUNTER: ICD-10-CM

## 2022-08-16 DIAGNOSIS — M25.551 RIGHT HIP PAIN: ICD-10-CM

## 2022-08-16 DIAGNOSIS — M24.151 DEGENERATIVE TEAR OF ACETABULAR LABRUM OF RIGHT HIP: ICD-10-CM

## 2022-08-16 PROCEDURE — 97110 THERAPEUTIC EXERCISES: CPT

## 2022-08-16 NOTE — OP THERAPY DAILY TREATMENT
Outpatient Physical Therapy  DAILY TREATMENT     [facility] [place]  [address]  [place] [address]  Phone:  [phone]  Fax:  [phone]    Date: 08/16/2022    Patient: Johanna Mata  YOB: 1995  MRN: [id number]     Time Calculation    Start time: 0730  Stop time: 0810 Time Calculation (min): 40 minutes         Chief Complaint: Hip Problem    Visit #: 12    SUBJECTIVE:  Patient reports that her hip is getting better. Notes that her leg pain and glut pain have been problematic.     OBJECTIVE:  Current objective measures:           Therapeutic Exercises (CPT 64005):     1. Basic tra edu, x10min    2. Edu to decrease flexion, x5min    3. Edu to use lumbar roll, x5min    4. Press up trial and assessment, x25min    5. Press up, x10-x40, decrease; better    6. Seated flexion, produce; worse    Time-based treatments/modalities:    Physical Therapy Timed Treatment Charges  Therapeutic exercise minutes (CPT 45935): 40 minutes      Pain rating (1-10) before treatment:  3  Pain rating (1-10) after treatment:  0    ASSESSMENT:   Response to treatment: Patient presents with signs and symptoms consistent with a lumbar derangement. Modification of her exercises will be done to accommodate this. Progress with current POC with modification for flexion limitations.     PLAN/RECOMMENDATIONS:   Plan for treatment: therapy treatment to continue next visit.  Planned interventions for next visit: continue with current treatment.

## 2022-08-17 ENCOUNTER — OFFICE VISIT (OUTPATIENT)
Dept: MEDICAL GROUP | Facility: PHYSICIAN GROUP | Age: 27
End: 2022-08-17
Payer: COMMERCIAL

## 2022-08-17 ENCOUNTER — RESEARCH ENCOUNTER (OUTPATIENT)
Dept: MEDICAL GROUP | Facility: PHYSICIAN GROUP | Age: 27
End: 2022-08-17
Payer: COMMERCIAL

## 2022-08-17 VITALS
SYSTOLIC BLOOD PRESSURE: 114 MMHG | BODY MASS INDEX: 23.3 KG/M2 | RESPIRATION RATE: 16 BRPM | HEART RATE: 88 BPM | HEIGHT: 66 IN | TEMPERATURE: 98.7 F | OXYGEN SATURATION: 97 % | DIASTOLIC BLOOD PRESSURE: 72 MMHG | WEIGHT: 145 LBS

## 2022-08-17 DIAGNOSIS — Z00.00 WELL ADULT EXAM: ICD-10-CM

## 2022-08-17 DIAGNOSIS — Z01.419 WELL WOMAN EXAM: ICD-10-CM

## 2022-08-17 DIAGNOSIS — Z00.6 RESEARCH STUDY PATIENT: ICD-10-CM

## 2022-08-17 DIAGNOSIS — Z98.890 STATUS POST SURGERY: ICD-10-CM

## 2022-08-17 DIAGNOSIS — E78.49 OTHER HYPERLIPIDEMIA: ICD-10-CM

## 2022-08-17 DIAGNOSIS — G89.29 CHRONIC LOW BACK PAIN, UNSPECIFIED BACK PAIN LATERALITY, UNSPECIFIED WHETHER SCIATICA PRESENT: ICD-10-CM

## 2022-08-17 DIAGNOSIS — M54.50 CHRONIC LOW BACK PAIN, UNSPECIFIED BACK PAIN LATERALITY, UNSPECIFIED WHETHER SCIATICA PRESENT: ICD-10-CM

## 2022-08-17 PROCEDURE — 99214 OFFICE O/P EST MOD 30 MIN: CPT | Performed by: INTERNAL MEDICINE

## 2022-08-17 RX ORDER — OXYCODONE HYDROCHLORIDE 5 MG/1
5-30 CAPSULE ORAL EVERY 4 HOURS PRN
COMMUNITY
End: 2022-08-17

## 2022-08-17 RX ORDER — OXYCODONE HYDROCHLORIDE 5 MG/1
5 TABLET ORAL
Qty: 14 TABLET | Refills: 0 | Status: SHIPPED | OUTPATIENT
Start: 2022-08-17 | End: 2022-08-31

## 2022-08-17 RX ORDER — CYCLOBENZAPRINE HCL 10 MG
10 TABLET ORAL 3 TIMES DAILY PRN
COMMUNITY
End: 2022-08-17

## 2022-08-17 SDOH — ECONOMIC STABILITY: TRANSPORTATION INSECURITY
IN THE PAST 12 MONTHS, HAS THE LACK OF TRANSPORTATION KEPT YOU FROM MEDICAL APPOINTMENTS OR FROM GETTING MEDICATIONS?: NO

## 2022-08-17 SDOH — ECONOMIC STABILITY: HOUSING INSECURITY
IN THE LAST 12 MONTHS, WAS THERE A TIME WHEN YOU DID NOT HAVE A STEADY PLACE TO SLEEP OR SLEPT IN A SHELTER (INCLUDING NOW)?: NO

## 2022-08-17 SDOH — HEALTH STABILITY: PHYSICAL HEALTH: ON AVERAGE, HOW MANY DAYS PER WEEK DO YOU ENGAGE IN MODERATE TO STRENUOUS EXERCISE (LIKE A BRISK WALK)?: 5 DAYS

## 2022-08-17 SDOH — ECONOMIC STABILITY: INCOME INSECURITY: HOW HARD IS IT FOR YOU TO PAY FOR THE VERY BASICS LIKE FOOD, HOUSING, MEDICAL CARE, AND HEATING?: NOT VERY HARD

## 2022-08-17 SDOH — HEALTH STABILITY: PHYSICAL HEALTH: ON AVERAGE, HOW MANY MINUTES DO YOU ENGAGE IN EXERCISE AT THIS LEVEL?: 20 MIN

## 2022-08-17 SDOH — ECONOMIC STABILITY: INCOME INSECURITY: IN THE LAST 12 MONTHS, WAS THERE A TIME WHEN YOU WERE NOT ABLE TO PAY THE MORTGAGE OR RENT ON TIME?: NO

## 2022-08-17 SDOH — ECONOMIC STABILITY: TRANSPORTATION INSECURITY
IN THE PAST 12 MONTHS, HAS LACK OF TRANSPORTATION KEPT YOU FROM MEETINGS, WORK, OR FROM GETTING THINGS NEEDED FOR DAILY LIVING?: NO

## 2022-08-17 SDOH — ECONOMIC STABILITY: FOOD INSECURITY: WITHIN THE PAST 12 MONTHS, THE FOOD YOU BOUGHT JUST DIDN'T LAST AND YOU DIDN'T HAVE MONEY TO GET MORE.: NEVER TRUE

## 2022-08-17 SDOH — ECONOMIC STABILITY: TRANSPORTATION INSECURITY
IN THE PAST 12 MONTHS, HAS LACK OF RELIABLE TRANSPORTATION KEPT YOU FROM MEDICAL APPOINTMENTS, MEETINGS, WORK OR FROM GETTING THINGS NEEDED FOR DAILY LIVING?: NO

## 2022-08-17 SDOH — ECONOMIC STABILITY: FOOD INSECURITY: WITHIN THE PAST 12 MONTHS, YOU WORRIED THAT YOUR FOOD WOULD RUN OUT BEFORE YOU GOT MONEY TO BUY MORE.: NEVER TRUE

## 2022-08-17 SDOH — ECONOMIC STABILITY: HOUSING INSECURITY: IN THE LAST 12 MONTHS, HOW MANY PLACES HAVE YOU LIVED?: 2

## 2022-08-17 SDOH — HEALTH STABILITY: MENTAL HEALTH
STRESS IS WHEN SOMEONE FEELS TENSE, NERVOUS, ANXIOUS, OR CAN'T SLEEP AT NIGHT BECAUSE THEIR MIND IS TROUBLED. HOW STRESSED ARE YOU?: NOT AT ALL

## 2022-08-17 ASSESSMENT — PATIENT HEALTH QUESTIONNAIRE - PHQ9
6. FEELING BAD ABOUT YOURSELF - OR THAT YOU ARE A FAILURE OR HAVE LET YOURSELF OR YOUR FAMILY DOWN: NOT AL ALL
SUM OF ALL RESPONSES TO PHQ QUESTIONS 1-9: 0
2. FEELING DOWN, DEPRESSED, IRRITABLE, OR HOPELESS: NOT AT ALL
4. FEELING TIRED OR HAVING LITTLE ENERGY: NOT AT ALL
7. TROUBLE CONCENTRATING ON THINGS, SUCH AS READING THE NEWSPAPER OR WATCHING TELEVISION: NOT AT ALL
8. MOVING OR SPEAKING SO SLOWLY THAT OTHER PEOPLE COULD HAVE NOTICED. OR THE OPPOSITE, BEING SO FIGETY OR RESTLESS THAT YOU HAVE BEEN MOVING AROUND A LOT MORE THAN USUAL: NOT AT ALL
1. LITTLE INTEREST OR PLEASURE IN DOING THINGS: NOT AT ALL
5. POOR APPETITE OR OVEREATING: NOT AT ALL
3. TROUBLE FALLING OR STAYING ASLEEP OR SLEEPING TOO MUCH: NOT AT ALL
SUM OF ALL RESPONSES TO PHQ9 QUESTIONS 1 AND 2: 0
9. THOUGHTS THAT YOU WOULD BE BETTER OFF DEAD, OR OF HURTING YOURSELF: NOT AT ALL

## 2022-08-17 ASSESSMENT — SOCIAL DETERMINANTS OF HEALTH (SDOH)
HOW OFTEN DO YOU GET TOGETHER WITH FRIENDS OR RELATIVES?: TWICE A WEEK
DO YOU BELONG TO ANY CLUBS OR ORGANIZATIONS SUCH AS CHURCH GROUPS UNIONS, FRATERNAL OR ATHLETIC GROUPS, OR SCHOOL GROUPS?: NO
HOW OFTEN DO YOU HAVE SIX OR MORE DRINKS ON ONE OCCASION: NEVER
WITHIN THE PAST 12 MONTHS, YOU WORRIED THAT YOUR FOOD WOULD RUN OUT BEFORE YOU GOT THE MONEY TO BUY MORE: NEVER TRUE
IN A TYPICAL WEEK, HOW MANY TIMES DO YOU TALK ON THE PHONE WITH FAMILY, FRIENDS, OR NEIGHBORS?: MORE THAN THREE TIMES A WEEK
HOW OFTEN DO YOU GET TOGETHER WITH FRIENDS OR RELATIVES?: TWICE A WEEK
ARE YOU MARRIED, WIDOWED, DIVORCED, SEPARATED, NEVER MARRIED, OR LIVING WITH A PARTNER?: LIVING WITH PARTNER
HOW OFTEN DO YOU HAVE A DRINK CONTAINING ALCOHOL: MONTHLY OR LESS
HOW OFTEN DO YOU ATTEND CHURCH OR RELIGIOUS SERVICES?: NEVER
HOW MANY DRINKS CONTAINING ALCOHOL DO YOU HAVE ON A TYPICAL DAY WHEN YOU ARE DRINKING: 1 OR 2
IN A TYPICAL WEEK, HOW MANY TIMES DO YOU TALK ON THE PHONE WITH FAMILY, FRIENDS, OR NEIGHBORS?: MORE THAN THREE TIMES A WEEK
HOW OFTEN DO YOU ATTENT MEETINGS OF THE CLUB OR ORGANIZATION YOU BELONG TO?: PATIENT DECLINED
DO YOU BELONG TO ANY CLUBS OR ORGANIZATIONS SUCH AS CHURCH GROUPS UNIONS, FRATERNAL OR ATHLETIC GROUPS, OR SCHOOL GROUPS?: NO
HOW HARD IS IT FOR YOU TO PAY FOR THE VERY BASICS LIKE FOOD, HOUSING, MEDICAL CARE, AND HEATING?: NOT VERY HARD
ARE YOU MARRIED, WIDOWED, DIVORCED, SEPARATED, NEVER MARRIED, OR LIVING WITH A PARTNER?: LIVING WITH PARTNER
HOW OFTEN DO YOU ATTENT MEETINGS OF THE CLUB OR ORGANIZATION YOU BELONG TO?: PATIENT DECLINED
HOW OFTEN DO YOU ATTEND CHURCH OR RELIGIOUS SERVICES?: NEVER

## 2022-08-17 ASSESSMENT — LIFESTYLE VARIABLES
SKIP TO QUESTIONS 9-10: 1
AUDIT-C TOTAL SCORE: 1
HOW OFTEN DO YOU HAVE A DRINK CONTAINING ALCOHOL: MONTHLY OR LESS
HOW OFTEN DO YOU HAVE SIX OR MORE DRINKS ON ONE OCCASION: NEVER
HOW MANY STANDARD DRINKS CONTAINING ALCOHOL DO YOU HAVE ON A TYPICAL DAY: 1 OR 2

## 2022-08-17 NOTE — ASSESSMENT & PLAN NOTE
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  Patient reported continuing pain.  She is currently taking oxycodone 5 mg at bedtime.  The patient is requesting a one-time refill until she sees the pain specialist on August 29, 2022.  No side effects reported.

## 2022-08-17 NOTE — PROGRESS NOTES
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  Patient reported continuing pain.  She is currently taking oxycodone 5 mg at bedtime.  The patient is requesting a one-time refill until she sees the pain specialist on August 29, 2022.  No side effects reported.    Chronic low back pain  This is a chronic condition.  The patient has appointment to see [facility] on August 29, 2022.    Other hyperlipidemia  Lab tests ordered for follow-up.    Current Outpatient Medications on File Prior to Visit   Medication Sig Dispense Refill    medroxyPROGESTERone (DEPO-PROVERA) 150 MG/ML Suspension       triamcinolone acetonide (KENALOG) 0.025 % Cream       acetaminophen (TYLENOL) 500 MG Tab Take 1,000 mg by mouth 2 times a day. Indications: Pain       No current facility-administered medications on file prior to visit.        Allergies: Patient has no known allergies.    ROS  As per HPI above. All other systems reviewed and negative.      Past Medical, Social, and Family history reviewed and updated in EPIC     Objective     /72 (BP Location: Left arm, Patient Position: Sitting, BP Cuff Size: Adult)   Pulse 88   Temp 37.1 °C (98.7 °F) (Temporal)   Resp 16   Ht 1.676 m (5' 6\")   Wt 65.8 kg (145 lb)   SpO2 97%    Body mass index is 23.4 kg/m².    General: alert in no apparent distress.  Cardiovascular: regular rate and rhythm  Pulmonary: lungs : no wheezing   Gastrointestinal: BS present. No obvious mass noted  Low back: No significant spinal deformity noted.   Pain noted w palpation of the lumbar region.  ROM : flexion, extension, rotation, lateral bend of back limited due to pain   "         Assessment and Plan     1. Status post surgery  - oxyCODONE immediate-release (ROXICODONE) 5 MG Tab; Take 1 Tablet by mouth at bedtime as needed for Severe Pain for up to 14 days.  Dispense: 14 Tablet; Refill: 0  - Controlled Substance Treatment Agreement    In prescribing controlled substances to this patient, I certify that I have obtained and reviewed the medical history of the patient. I have also made a good suyapa effort to obtain applicable records from other providers who have treated the patient.     I have reviewed patient's prescription history as maintained by the [place] Prescription Monitoring Program.      I have assessed the patient’s risk for abuse, dependency, and addiction using the validated Opioid Risk Tool     Given the above, I believe the benefits of controlled substance therapy outweigh the risks. The reasons for prescribing controlled substances include my professional opinion that controlled substances are a reasonable choice for this patient in the event other methods are ineffective inadequately controlling pain. Accordingly, I have discussed the risk and benefits, treatment plan, and alternative therapies with the patient.     Verbal informed consent was provided. Counseled pt on risks, benefits, and potential side effects of controlled substance treatment including but not limited to sedation, alcohol, driving, urine drug screens, tolerance, addiction, impaired judgement, and the risk of fatal overdose if not taken as prescribed; increased risk of overdose/death ; and proper storage and disposal of controlled substances.     Advised pt to take pain med only as needed as prescribed for severe pain /and not to exceed the prescribed amount.  -Explained to pt that goals of treatment plan are to 1)improve function 2)reduce pain level if possible 3)develop self management skills for controlling pain.    -Pt advised to go to nearest ER if any of the following symptoms develop: motor  weakness below baseline, respiratory depression, or mental status changes, intolerable side effects, bowel/bladder incontinence, or severe exacerbation of pain.    -UDS ordered      2. Chronic low back pain, unspecified back pain laterality, unspecified whether sciatica present  Patient takes oxycodone as above.  Advised the patient to keep appointment to see the spine specialist August 29, 2022.    3. Well adult exam  - Basic Metabolic Panel; Future  - CBC WITHOUT DIFFERENTIAL; Future  - Lipid Profile; Future  - VITAMIN D,25 HYDROXY; Future  - TSH; Future    4. Well woman exam  - Referral to Gynecology    5. Other hyperlipidemia  Lab tests ordered.  Recommend low-fat low-cholesterol diet.    follow-up 6 months                    Healthcare Maintenance     Health Maintenance Due   Topic Date Due    PAP SMEAR  01/14/2022    COVID-19 Vaccine (2 - Moderna series) 03/07/2022               Please note that this dictation was created using voice recognition software. I have made every reasonable attempt to correct obvious errors, but I expect that there are errors of grammar and possibly content that I did not discover before finalizing the note.    Bong Kessler MD  Internal Medicine  [facility]

## 2022-08-17 NOTE — ASSESSMENT & PLAN NOTE
This is a chronic condition.  The patient has appointment to see [facility] on August 29, 2022.

## 2022-08-19 ENCOUNTER — PHYSICAL THERAPY (OUTPATIENT)
Dept: PHYSICAL THERAPY | Facility: REHABILITATION | Age: 27
End: 2022-08-19
Attending: ORTHOPAEDIC SURGERY
Payer: COMMERCIAL

## 2022-08-19 DIAGNOSIS — M24.151 DEGENERATIVE TEAR OF ACETABULAR LABRUM OF RIGHT HIP: ICD-10-CM

## 2022-08-19 DIAGNOSIS — S73.191A TEAR OF ACETABULAR LABRUM, RIGHT, INITIAL ENCOUNTER: ICD-10-CM

## 2022-08-19 DIAGNOSIS — M25.551 RIGHT HIP PAIN: ICD-10-CM

## 2022-08-19 PROCEDURE — 97110 THERAPEUTIC EXERCISES: CPT

## 2022-08-22 ENCOUNTER — PHYSICAL THERAPY (OUTPATIENT)
Dept: PHYSICAL THERAPY | Facility: REHABILITATION | Age: 27
End: 2022-08-22
Attending: ORTHOPAEDIC SURGERY
Payer: COMMERCIAL

## 2022-08-22 DIAGNOSIS — M25.551 RIGHT HIP PAIN: ICD-10-CM

## 2022-08-22 DIAGNOSIS — S73.191A TEAR OF ACETABULAR LABRUM, RIGHT, INITIAL ENCOUNTER: ICD-10-CM

## 2022-08-22 PROCEDURE — 97110 THERAPEUTIC EXERCISES: CPT

## 2022-08-22 NOTE — OP THERAPY DAILY TREATMENT
Outpatient Physical Therapy  DAILY TREATMENT     [facility] Outpatient Physical Therapy [place]  [address], [address]  [place] [facility] [place] [address]  Phone:  [phone]  Fax:  [phone]    Date: 08/22/2022    Patient: Johanna Mata  YOB: 1995  MRN: [id number]     Time Calculation    Start time: 1330  Stop time: 1415 Time Calculation (min): 45 minutes         Chief Complaint: Hip Problem and Back Problem    Visit #: 14    SUBJECTIVE:  Patient reports that she is doing better. Notes an overall improvement in function and decreased symptoms.     OBJECTIVE:  Current objective measures:           Therapeutic Exercises (CPT 04291):     2. press ups, x10    3. stair training with 4in step ap    4. passive supine hip IR/ER, x2 min     5. prone neutral hip extension stretch, 2 x1 min    6. Bike , x10min upright    7. Supine bridge on round, x20    8. prone hip IR, x15    9. hooklying isometric TA ball press, 5 sec x10    10. SL hip abd, xfatigue    11. hooklying isometric HS curl into ball, 5sec x10    12. supine DKC ball roll, x20     13. hooklying TA bracing with ball OH flexion, x10    14. Stool ER/IR, x20    15. Glut step, x20    16. Gait training no a.d., x15min    Time-based treatments/modalities:    Physical Therapy Timed Treatment Charges  Therapeutic exercise minutes (CPT 39620): 45 minutes      Pain rating (1-10) before treatment:  0  Pain rating (1-10) after treatment:  0    ASSESSMENT:   Response to treatment: Patient responded well to therapy with an overall decrease in symptoms and improvement in function. Plan to continue with current post op protocol.     PLAN/RECOMMENDATIONS:   Plan for treatment: therapy treatment to continue next visit.  Planned interventions for next visit: continue with current treatment.

## 2022-08-25 ENCOUNTER — PHYSICAL THERAPY (OUTPATIENT)
Dept: PHYSICAL THERAPY | Facility: REHABILITATION | Age: 27
End: 2022-08-25
Attending: ORTHOPAEDIC SURGERY
Payer: COMMERCIAL

## 2022-08-25 DIAGNOSIS — M24.151 DEGENERATIVE TEAR OF ACETABULAR LABRUM OF RIGHT HIP: ICD-10-CM

## 2022-08-25 DIAGNOSIS — S73.191A TEAR OF ACETABULAR LABRUM, RIGHT, INITIAL ENCOUNTER: ICD-10-CM

## 2022-08-25 DIAGNOSIS — M25.551 RIGHT HIP PAIN: ICD-10-CM

## 2022-08-25 PROCEDURE — 97110 THERAPEUTIC EXERCISES: CPT

## 2022-08-25 NOTE — OP THERAPY DAILY TREATMENT
Outpatient Physical Therapy  DAILY TREATMENT     [facility] Outpatient Physical Therapy [place]  [address], [address]  [place] [facility] [address]  Phone:  [phone]  Fax:  [phone]    Date: 08/25/2022    Patient: Johanna Mata  YOB: 1995  MRN: [id number]     Time Calculation    Start time: 1030  Stop time: 1115 Time Calculation (min): 45 minutes         Chief Complaint: Hip Problem and Post-Op Pain    Visit #: 15    SUBJECTIVE:  Patient reports she is doing well. Now starting to work on endurance    OBJECTIVE:  Current objective measures:           Therapeutic Exercises (CPT 84031):     2. press ups, x10    3. stair training with 4in step ap    4. passive supine hip IR/ER, x2 min     5. prone neutral hip extension stretch, 2 x1 min    6. Bike , x10min upright    7. Supine bridge on round, x20    8. prone hip IR, x15    9. hooklying isometric TA ball press, 5 sec x10    10. SL hip abd, xfatigue    11. hooklying isometric HS curl into ball, 5sec x10    12. supine DKC ball roll, x20     13. hooklying TA bracing with ball OH flexion, x10    14. Stool ER/IR, x20    15. Glut step, x20    16. band ER/IR hip, x20    Time-based treatments/modalities:    Physical Therapy Timed Treatment Charges  Therapeutic exercise minutes (CPT 97359): 45 minutes      Pain rating (1-10) before treatment:  0  Pain rating (1-10) after treatment:  0    ASSESSMENT:   Response to treatment: Patient is responding well to therapy with an overall improvement in symptoms and improvement in ROM. Plan to continue per post op protocol.     PLAN/RECOMMENDATIONS:   Plan for treatment: therapy treatment to continue next visit.  Planned interventions for next visit: continue with current treatment.

## 2022-08-26 PROBLEM — E55.9 VITAMIN D DEFICIENCY: Status: ACTIVE | Noted: 2022-08-26

## 2022-08-26 PROBLEM — E78.5 DYSLIPIDEMIA: Status: ACTIVE | Noted: 2020-09-01

## 2022-08-30 ENCOUNTER — APPOINTMENT (OUTPATIENT)
Dept: PHYSICAL THERAPY | Facility: REHABILITATION | Age: 27
End: 2022-08-30
Attending: ORTHOPAEDIC SURGERY
Payer: COMMERCIAL

## 2022-09-02 ENCOUNTER — PHYSICAL THERAPY (OUTPATIENT)
Dept: PHYSICAL THERAPY | Facility: REHABILITATION | Age: 27
End: 2022-09-02
Attending: ORTHOPAEDIC SURGERY
Payer: COMMERCIAL

## 2022-09-02 DIAGNOSIS — M25.551 RIGHT HIP PAIN: ICD-10-CM

## 2022-09-02 DIAGNOSIS — S73.191A TEAR OF ACETABULAR LABRUM, RIGHT, INITIAL ENCOUNTER: ICD-10-CM

## 2022-09-02 PROCEDURE — 97110 THERAPEUTIC EXERCISES: CPT

## 2022-09-02 NOTE — OP THERAPY DAILY TREATMENT
Outpatient Physical Therapy  DAILY TREATMENT     [facility] Outpatient Physical Therapy [place]  [address], [address]  [place] [address]  Phone:  [phone]  Fax:  [phone]    Date: 09/02/2022    Patient: Johanna Mata  YOB: 1995  MRN: [id number]     Time Calculation    Start time: 1145  Stop time: 1230 Time Calculation (min): 45 minutes         Chief Complaint: Hip Problem and Post-Op Pain    Visit #: 16    SUBJECTIVE:  Patient reports that her hip continues to improve. Notes continued lumbar pain but improving.     OBJECTIVE:  Current objective measures:   Hip ER: 63deg  Hip IR: 19deg  Hip ext: 25deg  Hip flexion: 98deg  PT Functional Assessment Tool Used: LEFS  PT Functional Assessment Score: 63       Therapeutic Exercises (CPT 06221):     2. press ups, x10, 5/5 sag    3. stair training with 4in step ap    4. passive supine hip IR/ER, x2 min     5. prone neutral hip extension stretch, 2 x1 min    6. Bike , x10min upright    7. Supine bridge on round, x20    8. prone hip IR, x15    9. hooklying isometric TA ball press, 5 sec x10    10. SL hip abd, xfatigue    11. hooklying isometric HS curl into ball, 5sec x10    12. supine DKC ball roll, x20     13. hooklying TA bracing with ball OH flexion, x10    14. Stool ER/IR, x20    15. Glut step, x20    16. band ER/IR hip, x20    17. shuttle 4c, x4min    18. shuttle SL, 3c x2min    Time-based treatments/modalities:    Physical Therapy Timed Treatment Charges  Therapeutic exercise minutes (CPT 53193): 45 minutes      Pain rating (1-10) before treatment:  1  Pain rating (1-10) after treatment:  1    ASSESSMENT:   Response to treatment: Patient is continuing to respond well to therapy with an overall decrease in symptoms and improvement in function.     PLAN/RECOMMENDATIONS:   Plan for treatment: therapy treatment to continue next visit.  Planned interventions for next visit: continue with current treatment.

## 2022-09-02 NOTE — OP THERAPY PROGRESS SUMMARY
PROGRESS SUMMARY  Outpatient Physical Therapy  PROGRESS SUMMARY NOTE      [facility] [place]  [address]  [place] [address]  Phone:  [phone]  Fax:  [phone]    Date of Visit: 09/02/2022    Patient: Johanna Mata  YOB: 1995  MRN: [id number]     Referring Provider: Tejinder Solis M.D.  [address] [place] [address]   Referring Diagnosis Other sprain of right hip, initial encounter [S73.191A]     Visit Diagnoses     ICD-10-CM   1. Right hip pain  M25.551   2. Tear of acetabular labrum, right, initial encounter  S73.191A       Rehab Potential: excellent    Progress Report Period: 8/4/22 to 9/2/22    Functional Assessment Used  PT Functional Assessment Tool Used: LEFS  PT Functional Assessment Score: 63       Objective Findings and Assessment:   Patient progression towards goals: Patient has been seen for 16 visits. Patient is making substantial gains in return to function. ROM is steadily improving and symptoms are decreasing. Patient's radicular pain is also improving. Plan to continue per post op protocol. Patient is now progressing to phase 3. 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 Walking her route with 30lbs might be a significant challenge at this point still.     Objective findings and assessment details: Hip ER: 63deg  Hip IR: 19deg  Hip ext: 25deg  Hip flexion: 98deg    Goals:   Short Term Goals:   1) Patient's quad strength will improve by a half muscle grade to facilitate improved gait.  2) Patient's ROM will improve to 90 to facilitate progression with program.  Short term goal time span:  2-4 weeks      Long Term Goals:    1) Patient's symptoms will improve to allow for ambulation without crutches.  2) Patient's LEFS will improve by 30 to demonstrate functional improvement  Long term goal time span:  6-8 weeks    Plan:   Planned therapy interventions:  Gait Training (CPT 23294), E Stim Unattended (CPT  84197), Manual Therapy (CPT 12066), Neuromuscular Re-education (CPT 53375) and Therapeutic Exercise (CPT 22405)  Frequency:  2x week  Duration in weeks:  8    Referring provider co-signature:  I have reviewed this plan of care and my co-signature certifies the need for services.     Certification Period: 09/02/2022 to 10/28/22    Physician Signature: ________________________________ Date: ______________

## 2022-09-09 ENCOUNTER — PHYSICAL THERAPY (OUTPATIENT)
Dept: PHYSICAL THERAPY | Facility: REHABILITATION | Age: 27
End: 2022-09-09
Attending: ORTHOPAEDIC SURGERY
Payer: COMMERCIAL

## 2022-09-09 DIAGNOSIS — M25.551 RIGHT HIP PAIN: ICD-10-CM

## 2022-09-09 DIAGNOSIS — S73.191A TEAR OF ACETABULAR LABRUM, RIGHT, INITIAL ENCOUNTER: ICD-10-CM

## 2022-09-09 PROCEDURE — 97110 THERAPEUTIC EXERCISES: CPT

## 2022-09-09 NOTE — OP THERAPY DAILY TREATMENT
Outpatient Physical Therapy  DAILY TREATMENT     [facility] Outpatient Physical Therapy [place]  [address]  [place] [facility] [address]  Phone:  [phone]  Fax:  [phone]    Date: 09/09/2022    Patient: Johanna Mata  YOB: 1995  MRN: [id number]     Time Calculation    Start time: 1500  Stop time: 1545 Time Calculation (min): 45 minutes         Chief Complaint: Hip Problem    Visit #: 17    SUBJECTIVE:  Patient reports that symptoms have improved.     OBJECTIVE:  Current objective measures:           Therapeutic Exercises (CPT 05551):     2. press ups, x10, 5/5 sag    3. stair training with 4in step ap    4. passive supine hip IR/ER, x2 min     5. prone neutral hip extension stretch, 2 x1 min    6. Bike , x10min upright    7. Supine bridge on round, x20    8. prone hip IR, x15    9. hooklying isometric TA ball press, 5 sec x10    10. SL hip abd, xfatigue    11. hooklying isometric HS curl into ball, 5sec x10    12. supine DKC ball roll, x20     13. hooklying TA bracing with ball OH flexion, x10    14. Stool ER/IR, x20    15. Glut step, x20    16. band ER/IR hip, x20    17. shuttle 4c, x4min    18. shuttle SL, 3c x2min    Time-based treatments/modalities:    Physical Therapy Timed Treatment Charges  Therapeutic exercise minutes (CPT 15132): 40 minutes      Pain rating (1-10) before treatment:  0  Pain rating (1-10) after treatment:  0    ASSESSMENT:   Response to treatment: Patient responded well to therapy with an overall decrease in symptoms and improvement in function.     PLAN/RECOMMENDATIONS:   Plan for treatment: therapy treatment to continue next visit.  Planned interventions for next visit: continue with current treatment.

## 2022-09-16 ENCOUNTER — APPOINTMENT (OUTPATIENT)
Dept: PHYSICAL THERAPY | Facility: REHABILITATION | Age: 27
End: 2022-09-16
Attending: ORTHOPAEDIC SURGERY
Payer: COMMERCIAL

## 2022-09-18 ENCOUNTER — HOSPITAL ENCOUNTER (OUTPATIENT)
Dept: RADIOLOGY | Facility: MEDICAL CENTER | Age: 27
End: 2022-09-18
Attending: NURSE PRACTITIONER
Payer: COMMERCIAL

## 2022-09-18 DIAGNOSIS — M54.50 LOW BACK PAIN, UNSPECIFIED BACK PAIN LATERALITY, UNSPECIFIED CHRONICITY, UNSPECIFIED WHETHER SCIATICA PRESENT: ICD-10-CM

## 2022-09-18 PROCEDURE — 72148 MRI LUMBAR SPINE W/O DYE: CPT

## 2022-09-22 RX ORDER — TRIAMCINOLONE ACETONIDE 1 MG/G
OINTMENT TOPICAL
Qty: 80 G | Refills: 3 | Status: SHIPPED | OUTPATIENT
Start: 2022-09-22 | End: 2023-05-09

## 2022-09-23 ENCOUNTER — APPOINTMENT (OUTPATIENT)
Dept: PHYSICAL THERAPY | Facility: REHABILITATION | Age: 27
End: 2022-09-23
Attending: ORTHOPAEDIC SURGERY
Payer: COMMERCIAL

## 2022-09-25 LAB
APOB+LDLR+PCSK9 GENE MUT ANL BLD/T: NOT DETECTED
BRCA1+BRCA2 DEL+DUP + FULL MUT ANL BLD/T: NOT DETECTED
MLH1+MSH2+MSH6+PMS2 GN DEL+DUP+FUL M: NOT DETECTED

## 2022-09-30 ENCOUNTER — PHYSICAL THERAPY (OUTPATIENT)
Dept: PHYSICAL THERAPY | Facility: REHABILITATION | Age: 27
End: 2022-09-30
Attending: ORTHOPAEDIC SURGERY
Payer: COMMERCIAL

## 2022-09-30 DIAGNOSIS — M25.551 RIGHT HIP PAIN: ICD-10-CM

## 2022-09-30 DIAGNOSIS — S73.191A TEAR OF ACETABULAR LABRUM, RIGHT, INITIAL ENCOUNTER: ICD-10-CM

## 2022-09-30 PROCEDURE — 97110 THERAPEUTIC EXERCISES: CPT

## 2022-09-30 PROCEDURE — 97014 ELECTRIC STIMULATION THERAPY: CPT

## 2022-09-30 NOTE — OP THERAPY DAILY TREATMENT
Outpatient Physical Therapy  DAILY TREATMENT     [facility] Outpatient Physical Therapy [place]  [address]  [place] [address]  Phone:  [phone]  Fax:  [phone]    Date: 09/30/2022    Patient: Johanna Mata  YOB: 1995  MRN: [id number]     Time Calculation    Start time: 1330  Stop time: 1415 Time Calculation (min): 45 minutes         Chief Complaint: Back Problem and Hip Problem    Visit #: 18    SUBJECTIVE:  Patient reports that she is now back at work and her exercise compliance has gone down significantly. She reports increased leg and glut pain. States she is no longer doing her press ups.     OBJECTIVE:  Current objective measures:           Therapeutic Exercises (CPT 36957):     2. press ups, x10, 5/5 sag    4. passive supine hip IR/ER, x2 min     5. prone neutral hip extension stretch, 2 x1 min    6. Bike , x10min upright    7. Supine bridge on round, x20    8. prone hip IR, x15    9. hooklying isometric TA ball press, 5 sec x10    10. SL hip abd, xfatigue    11. hooklying isometric HS curl into ball, 5sec x10    13. hooklying TA bracing with ball OH flexion, x10    15. Glut step, x20    16. band ER/IR hip, x20    17. shuttle 4c, x4min    18. shuttle SL, 3c x2min    Therapeutic Treatments and Modalities:     1. E Stim Unattended (CPT 04623), IFC with CP to R hip x 15min 80-150hz  Time-based treatments/modalities:    Physical Therapy Timed Treatment Charges  Therapeutic exercise minutes (CPT 25983): 30 minutes      Pain rating (1-10) before treatment:  4  Pain rating (1-10) after treatment:  1    ASSESSMENT:   Response to treatment: Patient instructed on importance of HEP. Patient to restart lumbar stabilization program.     PLAN/RECOMMENDATIONS:   Plan for treatment: therapy treatment to continue next visit.  Planned interventions for next visit: continue with current treatment.

## 2022-10-05 ENCOUNTER — PHYSICAL THERAPY (OUTPATIENT)
Dept: PHYSICAL THERAPY | Facility: REHABILITATION | Age: 27
End: 2022-10-05
Attending: FAMILY MEDICINE
Payer: COMMERCIAL

## 2022-10-05 DIAGNOSIS — S73.191A TEAR OF ACETABULAR LABRUM, RIGHT, INITIAL ENCOUNTER: ICD-10-CM

## 2022-10-05 DIAGNOSIS — M25.551 RIGHT HIP PAIN: ICD-10-CM

## 2022-10-05 PROCEDURE — 97110 THERAPEUTIC EXERCISES: CPT

## 2022-10-06 NOTE — OP THERAPY DAILY TREATMENT
Outpatient Physical Therapy  DAILY TREATMENT     [facility] Outpatient Physical Therapy [place]  [address]  [facility] [place] [facility] [address]  Phone:  [phone]  Fax:  [phone]    Date: 10/05/2022    Patient: Johanna Mata  YOB: 1995  MRN: [id number]     Time Calculation    Start time: 1700  Stop time: 1738 Time Calculation (min): 38 minutes         Chief Complaint: Hip Problem and Post-Op Pain    Visit #: 19    SUBJECTIVE:  Patient reports that she is starting to feel better. Notes she is now being partially compliant with exercises.     OBJECTIVE:  Current objective measures:           Therapeutic Exercises (CPT 37787):     2. press ups, x10, 5/5 sag    4. passive supine hip IR/ER, x2 min     5. prone neutral hip extension stretch, 2 x1 min    6. Bike , x10min upright    7. Supine bridge on round, x20    8. prone hip IR, x15    9. hooklying isometric TA ball press, 5 sec x10    13. hooklying TA bracing with ball OH flexion, x10    16. band ER/IR hip, x20    17. shuttle 4c, x4min    18. shuttle SL, 3c x2min    Time-based treatments/modalities:    Physical Therapy Timed Treatment Charges  Therapeutic exercise minutes (CPT 74457): 38 minutes      Pain rating (1-10) before treatment:  2  Pain rating (1-10) after treatment:  1    ASSESSMENT:   Response to treatment: Patient continues to respond well to therapy with a decrease in symptoms with exercise.     PLAN/RECOMMENDATIONS:   Plan for treatment: therapy treatment to continue next visit.  Planned interventions for next visit: continue with current treatment.

## 2022-10-12 ENCOUNTER — APPOINTMENT (OUTPATIENT)
Dept: PHYSICAL THERAPY | Facility: REHABILITATION | Age: 27
End: 2022-10-12
Attending: FAMILY MEDICINE
Payer: COMMERCIAL

## 2022-10-28 ENCOUNTER — APPOINTMENT (OUTPATIENT)
Dept: PHYSICAL THERAPY | Facility: REHABILITATION | Age: 27
End: 2022-10-28
Attending: FAMILY MEDICINE
Payer: COMMERCIAL

## 2022-11-07 ENCOUNTER — HOSPITAL ENCOUNTER (OUTPATIENT)
Dept: RADIOLOGY | Facility: MEDICAL CENTER | Age: 27
End: 2022-11-07
Payer: OTHER MISCELLANEOUS

## 2022-11-07 ENCOUNTER — OCCUPATIONAL MEDICINE (OUTPATIENT)
Dept: URGENT CARE | Facility: PHYSICIAN GROUP | Age: 27
End: 2022-11-07
Payer: OTHER MISCELLANEOUS

## 2022-11-07 VITALS
SYSTOLIC BLOOD PRESSURE: 112 MMHG | TEMPERATURE: 98.6 F | HEART RATE: 89 BPM | HEIGHT: 66 IN | WEIGHT: 150.4 LBS | DIASTOLIC BLOOD PRESSURE: 74 MMHG | BODY MASS INDEX: 24.17 KG/M2 | OXYGEN SATURATION: 95 %

## 2022-11-07 DIAGNOSIS — M25.559 HIP PAIN: ICD-10-CM

## 2022-11-07 DIAGNOSIS — S83.91XA SPRAIN OF RIGHT KNEE, UNSPECIFIED LIGAMENT, INITIAL ENCOUNTER: ICD-10-CM

## 2022-11-07 DIAGNOSIS — S76.011A HIP STRAIN, RIGHT, INITIAL ENCOUNTER: ICD-10-CM

## 2022-11-07 DIAGNOSIS — S80.11XA CONTUSION OF RIGHT LOWER LEG, INITIAL ENCOUNTER: ICD-10-CM

## 2022-11-07 DIAGNOSIS — M25.561 ACUTE PAIN OF RIGHT KNEE: ICD-10-CM
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
## 2022-11-07 DIAGNOSIS — S80.811A ABRASION, RIGHT LOWER LEG, INITIAL ENCOUNTER: ICD-10-CM

## 2022-11-07 PROCEDURE — 73502 X-RAY EXAM HIP UNI 2-3 VIEWS: CPT | Mod: RT

## 2022-11-07 PROCEDURE — 99213 OFFICE O/P EST LOW 20 MIN: CPT

## 2022-11-07 PROCEDURE — 73564 X-RAY EXAM KNEE 4 OR MORE: CPT | Mod: RT

## 2022-11-07 RX ORDER — INDOMETHACIN 50 MG/1
50 CAPSULE ORAL 3 TIMES DAILY
Qty: 90 CAPSULE | Refills: 0 | Status: SHIPPED | OUTPATIENT
Start: 2022-11-07 | End: 2022-11-11

## 2022-11-07 RX ORDER — IBUPROFEN 800 MG/1
800 TABLET ORAL EVERY 8 HOURS PRN
Qty: 90 TABLET | Refills: 0 | Status: SHIPPED | OUTPATIENT
Start: 2022-11-07 | End: 2022-12-07

## 2022-11-07 RX ORDER — CYCLOBENZAPRINE HCL 5 MG
5-10 TABLET ORAL 2 TIMES DAILY PRN
Qty: 21 TABLET | Refills: 0 | Status: SHIPPED | OUTPATIENT
Start: 2022-11-07 | End: 2022-11-14

## 2022-11-07 RX ORDER — KETOROLAC TROMETHAMINE 30 MG/ML
30 INJECTION, SOLUTION INTRAMUSCULAR; INTRAVENOUS ONCE
Status: COMPLETED | OUTPATIENT
Start: 2022-11-07 | End: 2022-11-07

## 2022-11-07 RX ADMIN — KETOROLAC TROMETHAMINE 30 MG: 30 INJECTION, SOLUTION INTRAMUSCULAR; INTRAVENOUS at 15:28

## 2022-11-07 NOTE — PROGRESS NOTES
Subjective:   Johanna Mata is a 27 y.o. female who presents for Work-Related Injury (Per patient left hip felt a pop and burning after stepping in a hole / left knee scrape )      HPI:    Patient presents to urgent care with complaints of   Onset was   Mild improvement with  Associated symptoms  +/- fever, chills, night sweats, nausea, vomiting, diarrhea  Contact with sick individuals      ROS As above in HPI    Medications:    Current Outpatient Medications on File Prior to Visit   Medication Sig Dispense Refill    ibuprofen (MOTRIN) 800 MG Tab Take 1 Tablet by mouth every 8 hours as needed for Moderate Pain for up to 30 days. 90 Tablet 0    oxyCODONE immediate-release (ROXICODONE) 5 MG Tab Take 1 Tablet by mouth 2 times a day as needed.      tizanidine (ZANAFLEX) 4 MG Tab Take 1 Tablet by mouth 2 times a day as needed.      meloxicam (MOBIC) 15 MG tablet Take 1 Tablet by mouth every day. 30 Tablet 1    triamcinolone acetonide (KENALOG) 0.1 % Ointment Apply to affected area twice daily as needed sparingly as directed. 80 g 3    methocarbamol (ROBAXIN) 500 MG Tab Take 1 Tablet by mouth 2 times a day as needed.      medroxyPROGESTERone (DEPO-PROVERA) 150 MG/ML Suspension       acetaminophen (TYLENOL) 500 MG Tab Take 2 Tablets by mouth 2 times a day. Indications: Pain       No current facility-administered medications on file prior to visit.        Allergies:   Patient has no known allergies.    Problem List:   Patient Active Problem List   Diagnosis    Depo-Provera contraceptive status    Dyslipidemia    Status post surgery    Chronic low back pain    Vitamin D deficiency        Surgical History:  Past Surgical History:   Procedure Laterality Date    [place] ARTHROSCOPY HIP W/LABRAL REPAIR Right 6/16/2022    Procedure: Right hip arthroscopy, labral repair, acetabular rim trimming, femoral neck osteochondroplasty;  Surgeon: Tejinder Solis M.D.;  Location: [facility];   "Service: Orthopedics    [place] ARTHROSCOPY HIP W/FEMOROPLASTY Right 2022    Procedure: OSTEOPLASTY, FEMUR, NECK;  Surgeon: Tejinder Solis M.D.;  Location: [facility];  Service: Orthopedics    [place] ARTHROSCOPY HIP W/ACETABULOPLASTY Right 2022    Procedure: OSTEOPLASTY, ACETABULUM;  Surgeon: Tejinder Solis M.D.;  Location: [facility];  Service: Orthopedics    GYN SURGERY  5/10/2014    Performed by Mindi Germain M.D. at [facility]       Past Social Hx:   Social History     Tobacco Use    Smoking status: Former     Packs/day: 0.25     Years: 10.00     Pack years: 2.50     Types: Cigarettes     Quit date: 10/24/2021     Years since quittin.0    Smokeless tobacco: Never    Tobacco comments:     5-6 cigs a day   Vaping Use    Vaping Use: Some days    Substances: THC    Devices: Pre-filled or refillable cartridge   Substance Use Topics    Alcohol use: Not Currently     Alcohol/week: 4.8 - 6.0 oz     Types: 8 - 10 Shots of liquor per week     Comment: Weekends only    Drug use: Yes     Types: Inhaled, Marijuana          Problem list, medications, and allergies reviewed by myself today in Epic.     Objective:     /74 (BP Location: Right arm, Patient Position: Sitting, BP Cuff Size: Adult)   Pulse 89   Temp 37 °C (98.6 °F) (Temporal)   Ht 1.676 m (5' 6\")   Wt 68.2 kg (150 lb 6.4 oz)   SpO2 95%   BMI 24.28 kg/m²     Physical Exam    Assessment/Plan:       Results for orders placed or performed in visit on 22   DNA ANALYSIS DISCRETE SEQUENCE VARIATION PANEL   Result Value Ref Range    MLH1+MSH2+MSH6+PMS2+EPCAM Gene Deletion+Dup & Full Mutation Not Detected     BRCA1+BRCA2 Gene Deletion+Dup and Full Mutation Analysis Not Detected     APOB+LDLR+LDRAP1+PCSK9 Gene Deletion+Dup+Full Analysis Not Detected        Diagnosis and associated orders:   There are no diagnoses linked to this encounter.      Comments/MDM:     ***       Pt is " clinically stable at today's acute urgent care visit.  No acute distress noted. Appropriate for outpatient management at this time.       Discussed DDx, management options (risks,benefits, and alternatives to planned treatment), natural progression and supportive care.  Expressed understanding and the treatment plan was agreed upon. Questions were encouraged and answered   Return to urgent care prn if new or worsening sx or if there is no improvement in condition prn.    Educated in Red flags and indications to immediately call 911 or present to the Emergency Department.   Advised the patient to follow-up with the primary care physician for recheck, reevaluation, and consideration of further management.      Please note that this dictation was created using voice recognition software. I have made a reasonable attempt to correct obvious errors, but I expect that there are errors of grammar and possibly content that I did not discover before finalizing the note.    This note was electronically signed by Desi Lennon DNP

## 2022-11-07 NOTE — PROGRESS NOTES
"Subjective:     Johanna Mata is a 27 y.o. female who presents for Work-Related Injury (Per patient left hip felt a pop and burning after stepping in a hole / left knee scrape )      DOI: 11/7/22 at 1230  Patient presents for her first WC evaluation following fall sustained today at approximately 1230. 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 The patient states she fell into the box, approximately 3 feet into the ground. She reports that her lower leg hit the edge of the box and scrapped her skin as she fell into the ground. Patient did make contact with the ground. She was able to bear weight after the fall, but it is very painful to put weight on her right lower extremity. Per patient, she is experiencing pain in her right hip, and knee. She rates pain as 8/10. She has not taken antiinflammatories or applied ice packs at this time. Patient reports right right hip is tender at the anterior groin and anterior and lateral upper leg. She reports her right knee is painful to move and touch at the medial aspect. She endorses decreased range of motion in her right hip, knee; denies radiation of pain, denies numbness/tingling. She reports bruising and a dry scrape of her lower leg. Patient also reports hip operation in June 2022. She had \"bone trimmed and her labral tear repaired.\"     PMH:   No pertinent past medical history to this problem  MEDS:  Medications were reviewed in EMR  ALLERGIES:  Allergies were reviewed in EMR  SOCHX:  Works as a   FH:   No pertinent family history to this problem       Objective:     /74 (BP Location: Right arm, Patient Position: Sitting, BP Cuff Size: Adult)   Pulse 89   Temp 37 °C (98.6 °F) (Temporal)   Ht 1.676 m (5' 6\")   Wt 68.2 kg (150 lb 6.4 oz)   SpO2 95%   BMI 24.28 kg/m²     Hip: Right hip is tender to palpation. No deformity, dislocation, crepitus, bony " step-offs. No tenderness to lumbar spine, upper leg. Slightly reduced range of motion with flexion/extension, abduction/adduction. Strength is intact and symmetric to the left. Sensation is intact to light and sharp touch. No edema, erythema, ecchymosis appreciated.    Knee: The left medial knee is tender to palpation. There is medial joint line tenderness. No deformity, dislocation, crepitus, bony step-offs. No tenderness to upper leg, right ankle. Slightly reduced range of motion is flexion of knee secondary to pain. Strength is intact and symmetrical. Sensation is intact to light touch. Cap refill less than 2 seconds, 2+ DP pulse.    Left lateral lower leg is ecchymotic with a superficial abrasion that is dry. Lower leg has trace edema.     DX-HIP-COMPLETE - UNILATERAL 2+ RIGHT 11/07/2022    Narrative  11/7/2022 3:12 PM    HISTORY/REASON FOR EXAM:  Pelvic/Hip Pain Following Trauma; Fell into irrigation/meter box, approximately 3 feet. Had operation on right hip in June..    TECHNIQUE/EXAM DESCRIPTION AND NUMBER OF VIEWS:  2 views of the RIGHT hip.    COMPARISON: 10/31/2021 and 11/28/2021    FINDINGS:  No acute fracture or subluxation is seen.    Joint spaces are preserved    No bony spurring    16 mm region of sclerosis in the intertrochanteric femur is unchanged and of no acute significance. May be a bone island or osteochondroma    Impression  No radiographic evidence of acute traumatic injury.      DX-KNEE COMPLETE 4+ RIGHT 11/07/2022    Narrative  11/7/2022 3:11 PM    HISTORY/REASON FOR EXAM: Pain/Deformity Following Trauma. Fall today with anterior pain.    TECHNIQUE/EXAM DESCRIPTION AND NUMBER OF VIEWS: 4 views of the RIGHT knee.    COMPARISON: None    FINDINGS:  No joint effusion is seen.    No displaced fracture is visualized.    There is no subluxation.    No degenerative change.    Impression  Negative knee series.      Assessment/Plan:       1. Fall from ground level  - DX-HIP-COMPLETE - UNILATERAL 2+  RIGHT; Future  - DX-KNEE COMPLETE 4+ RIGHT; Future  - ketorolac (TORADOL) injection 30 mg    2. Hip strain, right, initial encounter  - DX-HIP-COMPLETE - UNILATERAL 2+ RIGHT; Future  - ketorolac (TORADOL) injection 30 mg  - Referral to Orthopedics  - cyclobenzaprine (FLEXERIL) 5 mg tablet; Take 1-2 Tablets by mouth 2 times a day as needed for Muscle Spasms for up to 7 days.  Dispense: 21 Tablet; Refill: 0  - ibuprofen (MOTRIN) 800 MG Tab; Take 1 Tablet by mouth every 8 hours as needed for Moderate Pain for up to 30 days.  Dispense: 90 Tablet; Refill: 0  - indomethacin (INDOCIN) 50 MG Cap; Take 1 Capsule by mouth 3 times a day.  Dispense: 90 Capsule; Refill: 0    3. Sprain of right knee, unspecified ligament, initial encounter  - DX-KNEE COMPLETE 4+ RIGHT; Future  - ketorolac (TORADOL) injection 30 mg  - Knee Brace  - cyclobenzaprine (FLEXERIL) 5 mg tablet; Take 1-2 Tablets by mouth 2 times a day as needed for Muscle Spasms for up to 7 days.  Dispense: 21 Tablet; Refill: 0  - ibuprofen (MOTRIN) 800 MG Tab; Take 1 Tablet by mouth every 8 hours as needed for Moderate Pain for up to 30 days.  Dispense: 90 Tablet; Refill: 0  - indomethacin (INDOCIN) 50 MG Cap; Take 1 Capsule by mouth 3 times a day.  Dispense: 90 Capsule; Refill: 0    4. Contusion of right lower leg, initial encounter    5. Abrasion, right lower leg, initial encounter    Released to Restricted Duty FROM 11/7/2022 TO 11/11/2022  D39 and C4 completed today  Avoid provocative activites  Restrictions per D39  Patient is to rest, apply ice pack, elevate extremity, and wear knee brace with weight bearing activities  No drinking or driving while using Flexeril  Referral to ortho due to hip operation in June  Follow up in 4 day days   DOL Duty Status Form completed and scanned into chart   Abrasion was cleansed with NS and thin layer of polysporin applied and dressed with nonstick pad and Tegaderm.    Differential diagnosis, natural history, supportive care,  and indications for immediate follow-up discussed.

## 2022-11-07 NOTE — LETTER
"EMPLOYEE’S CLAIM FOR COMPENSATION/ REPORT OF INITIAL TREATMENT  FORM C-4    EMPLOYEE’S CLAIM - PROVIDE ALL INFORMATION REQUESTED   First Name  Johanna Last Name  Adolfo Birthdate                    1995                Sex  female Claim Number (Insurer’s Use Only)    Home Address  [address] Age  27 y.o. Height  1.676 m (5' 6\") Weight  68.2 kg (150 lb 6.4 oz) [facility]     [facility] [place]  [address] Telephone  [phone] (home)    Mailing Address  [address] [facility] Zip  [address] Primary Language Spoken  English    Insurer   Third-Party       Employee's Occupation (Job Title) When Injury or Occupational Disease Occurred      Employer's Name/Company Name     Telephone      Office Mail Address (Number and Street)     City    State    Zip      Date of Injury  11/7/2022               Hours Injury  12:10 PM Date Employer Notified  11/7/2022 Last Day of Work after Injury     or Occupational Disease  11/7/2022 Supervisor to Whom Injury     Reported  Damaso Jasso   Address or Location of Accident (if applicable)  Work [1]   What were you doing at the time of accident? 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 (Be specific an answer in detail. 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   Witnesses to the Accident  a white man with brown hair and glasses      Nature of Injury or Occupational Disease  Strain  Part(s) of Body Injured or Affected  Knee (R), Hip (R),     I certify " that the above is true and correct to the best of my knowledge and that I have provided this information in order to obtain the benefits of [place]  I hereby authorize any physician, chiropractor, surgeon, practitioner, or other person, any hospital, including [facility] or [facility], any medical service organization, any insurance company, or other institution or organization to release to each other, any medical or other information, including benefits paid or payable, pertinent to this injury or disease, except information relative to diagnosis, treatment and/or counseling for AIDS, psychological conditions, alcohol or controlled substances, for which I must give specific authorization.  A Photostat of this authorization shall be as valid as the original.     Date   Place Employee’s Original or  *Electronic Signature   THIS REPORT MUST BE COMPLETED AND MAILED WITHIN 3 WORKING DAYS OF TREATMENT   Place  [facility]  Name of Facility  [employer]   Date  11/7/2022 Diagnosis and Description of Injury or Occupational Disease  (W18.30XA) Fall from ground level  (S76.011A) Hip strain, right, initial encounter  (S83.91XA) Sprain of right knee, unspecified ligament, initial encounter  (S80.11XA) Contusion of right lower leg, initial encounter  (S80.811A) Abrasion, right lower leg, initial encounter Is there evidence the injured employee was under the influence of alcohol and/or another controlled substance at the time of accident?  ? No ? Yes (if yes, please explain)    Hour  2:21 PM   Diagnoses of Fall from ground level, Hip strain, right, initial encounter, Sprain of right knee, unspecified ligament, initial encounter, Contusion of right lower leg, initial encounter, and Abrasion, right lower leg, initial encounter were pertinent to this visit. No   Treatment  Rest, ice, elevate, knee  brace  Tylenol/IBU per package instructions  Flexeril, no drinking or driving while using medications  Gentle hip and knee exercies  Follow up with Ortho Surgeon regarding labral tear  Return for evaluation in 4 days  Have you advised the patient to remain off work five days or     more?    X-Ray Findings  Negative   ? Yes Indicate dates:   From   To      From information given by the employee, together with medical evidence, can        you directly connect this injury or occupational disease as job incurred?  Yes ? No If no, is the injured employee capable of:  ? full duty  No ? modified duty  Yes   Is additional medical care by a physician indicated?    If Modified Duty, Specify any Limitations / Restrictions  Restrictions per D39   Do you know of any previous injury or disease contributing to this condition or occupational disease?  ? Yes ? No (Explain if yes)                          Yes  Comments:Patient had arthroscopy and repaired labral tear in June 2022 of the affected hip   Date  11/7/2022 Print Health Care Provider's   Desi Lennon D.N.P. I certify the employer’s copy of  this form was mailed on:   Address  [address] [place] [address]. Insurer’s Use Only     [facility]  [address]    Provider’s Tax ID Number  [id number] Telephone  Dept: [phone]             Health Care Provider’s Original or Electronic Signature  e-DESI Harden.N.P. Degree (MD,DO, DC,PA-C,APRN)   APRN      * Complete and attach Release of Information (Form C-4A) when injured employee signs C-4 Form electronically  ORIGINAL - TREATING HEALTHCARE PROVIDER PAGE 2 - INSURER/TPA PAGE 3 - EMPLOYER PAGE 4 - EMPLOYEE             Form C-4 (rev.08/21)           BRIEF DESCRIPTION OF RIGHTS AND BENEFITS  (Pursuant to [place] 616C.050)    Notice of Injury or Occupational Disease (Incident Report Form C-1): If an injury or occupational disease (OD) arises out of and in the course of employment, you must provide written notice to  "your employer as soon as practicable, but no later than 7 days after the accident or OD. Your employer shall maintain a sufficient supply of the required forms.    Claim for Compensation (Form C-4): If medical treatment is sought, the form C-4 is available at the place of initial treatment. A completed \"Claim for Compensation\" (Form C-4) must be filed within 90 days after an accident or OD. The treating physician or chiropractor must, within 3 working days after treatment, complete and mail to the employer, the employer's insurer and third-party , the Claim for Compensation.    Medical Treatment: If you require medical treatment for your on-the-job injury or OD, you may be required to select a physician or chiropractor from a list provided by your workers’ compensation insurer, if it has contracted with an Organization for Managed Care (MCO) or Preferred Provider Organization (PPO) or providers of health care. If your employer has not entered into a contract with an MCO or PPO, you may select a physician or chiropractor from the Panel of Physicians and Chiropractors. Any medical costs related to your industrial injury or OD will be paid by your insurer.    Temporary Total Disability (TTD): If your doctor has certified that you are unable to work for a period of at least 5 consecutive days, or 5 cumulative days in a 20-day period, or places restrictions on you that your employer does not accommodate, you may be entitled to TTD compensation.    Temporary Partial Disability (TPD): If the wage you receive upon reemployment is less than the compensation for TTD to which you are entitled, the insurer may be required to pay you TPD compensation to make up the difference. TPD can only be paid for a maximum of 24 months.    Permanent Partial Disability (PPD): When your medical condition is stable and there is an indication of a PPD as a result of your injury or OD, within 30 days, your insurer must arrange for " an evaluation by a rating physician or chiropractor to determine the degree of your PPD. The amount of your PPD award depends on the date of injury, the results of the PPD evaluation, your age and wage.    Permanent Total Disability (PTD): If you are medically certified by a treating physician or chiropractor as permanently and totally disabled and have been granted a PTD status by your insurer, you are entitled to receive monthly benefits not to exceed 66 2/3% of your average monthly wage. The amount of your PTD payments is subject to reduction if you previously received a lump-sum PPD award.    Vocational Rehabilitation Services: You may be eligible for vocational rehabilitation services if you are unable to return to the job due to a permanent physical impairment or permanent restrictions as a result of your injury or occupational disease.    Transportation and Per Fl[place] Reimbursement: You may be eligible for travel expenses and per flakito associated with medical treatment.    Reopening: You may be able to reopen your claim if your condition worsens after claim closure.     Appeal Process: If you disagree with a written determination issued by the insurer or the insurer does not respond to your request, you may appeal to the Department of Administration, , by following the instructions contained in your determination letter. You must appeal the determination within 70 days from the date of the determination letter at [address], [address], [place], [place] [address], or [address] [facility] [address], [place] [address]. If you disagree with the  decision, you may appeal to the Department of Administration, . You must file your appeal within 30 days from the date of the  decision letter at [address], [address], [place], [place] [address], or [address] [facility] [facility] [facility] [address], [place] [address]. If you disagree with a  decision of an , you may file a petition for judicial review with the District Court. You must do so within 30 days of the Appeal Officer’s decision. You may be represented by an  at your own expense or you may contact the [facility] for possible representation.    [place]  for Injured Workers (NAIW): If you disagree with a  decision, you may request that NAIW represent you without charge at an  Hearing. For information regarding denial of benefits, you may contact the [facility] at: [address], [address], [place] [address], [phone], or [address], [place] [address], [phone]    To File a Complaint with the Division: If you wish to file a complaint with the  of the Division of Industrial Relations (DIR),  please contact the Workers’ Compensation Section, [address], [address], [place], [place] [address], telephone [phone], or [address], [address], [place], [place] [address], telephone [phone]) [phone].    For assistance with Workers’ Compensation Issues: You may contact the [facility] Office for Consumer Health Assistance, [address], [address], [place], [place] [address], Toll Free 1-570.275.1855, Web site: [email or web address]/MAGALI E-mail: [email or web address]              __________________________________________________________________                                    _________________            Employee Name / Signature                                                                                                                            Date                                                                                                                                                                                                                              D-2 (rev. 10/20)

## 2022-11-07 NOTE — LETTER
[place] [address] [facility] [place] [address]  Phone:  [phone] - Fax:  [phone]   [facility] Progress Report and Disability Certification  Date of Service: 11/7/2022   No Show:  No  Date / Time of Next Visit: 11/11/2022   Claim Information   Patient Name: Johanna Mata  Claim Number:     Employer:    Date of Injury: 11/7/2022     Insurer / TPA:    ID / SSN:     Occupation:   Diagnosis: Diagnoses of Fall from ground level, Hip strain, right, initial encounter, Sprain of right knee, unspecified ligament, initial encounter, Contusion of right lower leg, initial encounter, and Abrasion, right lower leg, initial encounter were pertinent to this visit.    Medical Information   Related to Industrial Injury? Yes    Subjective Complaints:  DOI: 11/7/22 at 1230  Patient presents for her first WC evaluation following fall sustained today at approximately 1230. 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 The patient states she fell into the box, approximately 3 feet into the ground. She reports that her lower leg hit the edge of the box and scrapped her skin as she fell into the ground. Patient did make contact with the ground. She was able to bear weight after the fall, but it is very painful to put weight on her right lower extremity. Per patient, she is experiencing pain in her right hip, and knee. She rates pain as 8/10. She has not taken antiinflammatories or applied ice packs at this time. Patient reports right right hip is tender at the anterior groin and anterior and lateral upper leg. She reports her right knee is painful to move and touch at the medial aspect. She endorses decreased range of motion in her right hip, knee; denies radiation of pain, denies numbness/tingling. She reports bruising and a dry scrape of her lower leg. Patient also  "reports hip operation in 2022. She had \"bone trimmed and her labral tear repaired.\"    Objective Findings: Hip: Right hip is tender to palpation. No deformity, dislocation, crepitus, bony step-offs. No tenderness to lumbar spine, upper leg. Slightly reduced range of motion with flexion/extension, abduction/adduction. Strength is intact and symmetric to the left. Sensation is intact to light and sharp touch. No edema, erythema, ecchymosis appreciated.    Knee: The left medial knee is tender to palpation. There is medial joint line tenderness. No deformity, dislocation, crepitus, bony step-offs. No tenderness to upper leg, right ankle. Slightly reduced range of motion is flexion of knee secondary to pain. Strength is intact and symmetrical. Sensation is intact to light touch. Cap refill less than 2 seconds, 2+ DP pulse.    Left lateral lower leg is ecchymotic with a superficial abrasion that is dry. Lower leg has trace edema.    Pre-Existing Condition(s): Right hip arthroscopy and repair of labral tear in 2022   Assessment:   Initial Visit    Status: Additional Care Required  Permanent Disability:No    Plan: Medication    Diagnostics: X-ray    Comments:       Disability Information   Status: Released to Restricted Duty    From:  2022  Through: 2022 Restrictions are: Temporary   Physical Restrictions   Sitting:  < or = to 4 hrs/day Standing:  < or = to 4 hrs/day Stooping:  < or = to 2 hrs/day Bending:  < or = to 2 hrs/day   Squattin hrs/day Walking:  < or = to 2 hrs/day Climbin hrs/day Pushing:    Comments:To not exceed weight limits   Pulling:    Comments:To not exceed weight limits Other:    Reaching Above Shoulder (L):   Reaching Above Shoulder (R):       Reaching Below Shoulder (L):    Reaching Below Shoulder (R):      Not to exceed Weight Limits   Carrying(hrs):   Weight Limit(lb): < or = to 25 pounds Lifting(hrs):   Weight  Limit(lb): < or = to 25 pounds   Comments: D39 and C4 " completed today  Avoid provocative activites  Restrictions per D39  Patient is to rest, apply ice pack, elevate extremity, and wear knee brace with weight bearing activities  No drinking or driving while using Flexeril  Referral to ortho due to hip operation in June  Follow up in 4 day days    Repetitive Actions   Hands: i.e. Fine Manipulations from Grasping:     Feet: i.e. Operating Foot Controls:     Driving / Operate Machinery:     Health Care Provider’s Original or Electronic Signature  Desi Lennon D.N.P. Health Care Provider’s Original or Electronic Signature    Alberto Massey MD         Clinic Name / Location: [address] [address] Clinic Phone Number: Dept: [phone]   Appointment Time: 1:10 Pm Visit Start Time: 2:21 PM   Check-In Time:  1:39 Pm Visit Discharge Time:  4:30PM   Original-Treating Physician or Chiropractor    Page 2-Insurer/TPA    Page 3-Employer    Page 4-Employee

## 2022-11-11 ENCOUNTER — OCCUPATIONAL MEDICINE (OUTPATIENT)
Dept: URGENT CARE | Facility: PHYSICIAN GROUP | Age: 27
End: 2022-11-11
Payer: OTHER MISCELLANEOUS

## 2022-11-11 VITALS
WEIGHT: 125 LBS | OXYGEN SATURATION: 98 % | RESPIRATION RATE: 16 BRPM | DIASTOLIC BLOOD PRESSURE: 62 MMHG | HEIGHT: 66 IN | BODY MASS INDEX: 20.09 KG/M2 | HEART RATE: 95 BPM | TEMPERATURE: 98.6 F | SYSTOLIC BLOOD PRESSURE: 100 MMHG
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
## 2022-11-11 DIAGNOSIS — S83.91XA SPRAIN OF RIGHT KNEE, UNSPECIFIED LIGAMENT, INITIAL ENCOUNTER: ICD-10-CM

## 2022-11-11 DIAGNOSIS — S80.11XA CONTUSION OF RIGHT LOWER LEG, INITIAL ENCOUNTER: ICD-10-CM

## 2022-11-11 DIAGNOSIS — S80.811A ABRASION, RIGHT LOWER LEG, INITIAL ENCOUNTER: ICD-10-CM

## 2022-11-11 DIAGNOSIS — S76.011A HIP STRAIN, RIGHT, INITIAL ENCOUNTER: ICD-10-CM

## 2022-11-11 PROCEDURE — 99214 OFFICE O/P EST MOD 30 MIN: CPT | Performed by: NURSE PRACTITIONER

## 2022-11-11 NOTE — PATIENT INSTRUCTIONS
-Continue to take previously prescribed medication as indicated. Flexeril not to be take at work.   -Can also take tylenol for pain.   -RICE Therapy: Rest, Ice, Compression, Elevation  -Weight bearing as tolerated. Can use crutches until able to walk with normal gait.   -Compression with an elastic bandage for swelling.  -Right knee brace  -Follow up in 1 week. Follow up as planned with Ortho on Monday.    Follow up emergently for severe uncontrolled pain, neurovascular compromise (decreased sensation, motion, or circulation).

## 2022-11-11 NOTE — LETTER
"   [facility] [place]  [address]  [place] [address]  Phone:  [phone] - Fax:  [phone]   [facility] Progress Report and Disability Certification  Date of Service: 11/11/2022   No Show:  No  Date / Time of Next Visit: 11/18/2022   Claim Information   Patient Name: Johanna Mata  Claim Number:     Employer:    Date of Injury: 11/7/2022     Insurer / TPA: [employer]  ID / SSN:     Occupation:   Diagnosis: Diagnoses of Sprain of right knee, unspecified ligament, initial encounter, Hip strain, right, initial encounter, Fall from ground level, Contusion of right lower leg, initial encounter, and Abrasion, right lower leg, initial encounter were pertinent to this visit.    Medical Information   Related to Industrial Injury? Yes    Subjective Complaints:  DOI: 11/7/2022. Patient reports gradual improvement from initial visit. Has had increased swelling and bruising to her right lower leg. No calf pain. Knee and leg pain is increased with walking and standing. Knee pain 6/10 with walking. Tinging in lower leg leg with change of position tingling from knee to ankle, lasts briefly. Stiffness in knee, feels like it gives with ambulation. Achy pain in right hip and groin. \"Soreness\" in left hip and ankle. Lower back pain. Denies neck pain. Taking  Ibuprofen and Flexeril. Also using the knee brace as directed.        Objective Findings: Physical Exam  Vitals reviewed.   Cardiovascular:      Pulses:           Dorsalis pedis pulses are 2+ on the right side.   Musculoskeletal:         General: Swelling, tenderness and signs of injury present.      Right hip: Tenderness present. No crepitus.      Right knee: Swelling and crepitus present. Tenderness present over the medial joint line.      Right lower leg: Swelling and tenderness present.      Right ankle: Normal.      Comments: Right posterior hip and groin tenderness to palpation. Crepitus of " right knee with ROM. Able to fully extend leg. Guarded to ambulation. Bruising, swelling to medial knee and shin. Area tender to palpation. No drainage or erythema of abrasion. No calf swelling. Heat, or erythema. Mild left ankle TTP. TTP of left posterior hip, and lower lumbar/sacral area.    Skin:     General: Skin is warm and dry.      Findings: Abrasion and bruising present. No erythema.   Neurological:      Mental Status: She is alert and oriented to person, place, and time.      Pre-Existing Condition(s): Denies hx of previous knee injury or pain. Previous right hip surgery.   Assessment:   Condition Improved    Status: Additional Care Required  Permanent Disability:No    Plan: MedicationDiagnosticsMedication (NOT at Work)    Diagnostics: MRI    Comments:       Disability Information   Status: Released to Restricted Duty    From:  2022  Through: 2022 Restrictions are:     Physical Restrictions   Sitting:    Standing:  < or = to 1 hr/day  Comments:With the support of crutches as needed. Stoopin hrs/day Bendin hrs/day   Squattin hrs/day Walking:  < or = to 1 hr/day Climbin hrs/day Pushin hrs/day   Pullin hrs/day Other:    Reaching Above Shoulder (L):   Reaching Above Shoulder (R):       Reaching Below Shoulder (L):    Reaching Below Shoulder (R):      Not to exceed Weight Limits   Carrying(hrs):   Weight Limit(lb): < or = to 10 pounds Lifting(hrs):   Weight  Limit(lb): < or = to 10 pounds   Comments: - MR-KNEE-W/O RIGHT; Future  - Referral to Radiology  -Continue to take previously prescribed medication as indicated. Flexeril not to be take at work.   -Can also take tylenol for pain.   -RICE Therapy: Rest, Ice, Compression, Elevation  -Weight bearing as tolerated. Can use crutches until able to walk with normal gait.   -Compression with an elastic bandage for swelling.  -Right knee brace  -Follow up in 1 week. Follow up as planned with Ortho on Monday.    Follow up  emergently for severe uncontrolled pain, neurovascular compromise (decreased sensation, motion, or circulation).     Repetitive Actions   Hands: i.e. Fine Manipulations from Grasping:     Feet: i.e. Operating Foot Controls:     Driving / Operate Machinery:     Health Care Provider’s Original or Electronic Signature  CAMILLE Pavon Health Care Provider’s Original or Electronic Signature    Get Quinteros DO MPH     Clinic Name / Location: [address] [address] Clinic Phone Number: Dept: [phone]   Appointment Time: 10:00 Am Visit Start Time: 10:51 AM   Check-In Time:  9:55 Am Visit Discharge Time: 12:50   Original-Treating Physician or Chiropractor    Page 2-Insurer/TPA    Page 3-Employer    Page 4-Employee

## 2022-11-13 RX ORDER — MEDROXYPROGESTERONE ACETATE 150 MG/ML
INJECTION, SUSPENSION INTRAMUSCULAR
COMMUNITY
Start: 2022-11-09 | End: 2023-09-03

## 2022-11-14 ASSESSMENT — ENCOUNTER SYMPTOMS: BACK PAIN: 1

## 2022-11-18 ENCOUNTER — OCCUPATIONAL MEDICINE (OUTPATIENT)
Dept: URGENT CARE | Facility: PHYSICIAN GROUP | Age: 27
End: 2022-11-18
Payer: OTHER MISCELLANEOUS

## 2022-11-18 VITALS
RESPIRATION RATE: 16 BRPM | BODY MASS INDEX: 27.6 KG/M2 | TEMPERATURE: 98 F | HEIGHT: 62 IN | HEART RATE: 77 BPM | DIASTOLIC BLOOD PRESSURE: 84 MMHG | OXYGEN SATURATION: 98 % | SYSTOLIC BLOOD PRESSURE: 116 MMHG | WEIGHT: 150 LBS
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
## 2022-11-18 DIAGNOSIS — S76.011D HIP STRAIN, RIGHT, SUBSEQUENT ENCOUNTER: ICD-10-CM

## 2022-11-18 DIAGNOSIS — S83.91XD SPRAIN OF RIGHT KNEE, UNSPECIFIED LIGAMENT, SUBSEQUENT ENCOUNTER: ICD-10-CM

## 2022-11-18 PROCEDURE — 99213 OFFICE O/P EST LOW 20 MIN: CPT

## 2022-11-18 NOTE — PROGRESS NOTES
"Subjective:   Johanna Mata is a 27 y.o. female who presents for Follow-Up (W/C DOI: 11/07/22 right knee pain )      HPI:  ORIGINAL HPI COPIED DOI: 11/7/22 at 1230  Patient presents for her first WC evaluation following fall sustained today at approximately 1230. 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 The patient states she fell into the box, approximately 3 feet into the ground. She reports that her lower leg hit the edge of the box and scrapped her skin as she fell into the ground. Patient did make contact with the ground. She was able to bear weight after the fall, but it is very painful to put weight on her right lower extremity. Per patient, she is experiencing pain in her right hip, and knee. She rates pain as 8/10. She has not taken antiinflammatories or applied ice packs at this time. Patient reports right right hip is tender at the anterior groin and anterior and lateral upper leg. She reports her right knee is painful to move and touch at the medial aspect. She endorses decreased range of motion in her right hip, knee; denies radiation of pain, denies numbness/tingling. She reports bruising and a dry scrape of her lower leg. Patient also reports hip operation in June 2022. She had \"bone trimmed and her labral tear repaired.\"       3rd visit 11/18/2022: Patient's condition remains the same.  She has been ambulatory without crutches.  She has used right knee brace and compression.  She has had consistent pain to right knee.  She reports pain is 5\10 to knee.  She does have associated numbness and tingling that extends from her knee down.  She has had continued pain to right hip.  She has been elevating, applying ice, and taking ibuprofen, flexeril, and oxycodone for pain.  Patient is pending orthopedic follow-up with [facility].  No previous injury or trauma to right knee. She has had previous labrum tear to " "her right hip.     ROS per HPI        Problem list, medications, and allergies reviewed by myself today in Epic, not pertinent to today's visit.     Objective:     /84   Pulse 77   Temp 36.7 °C (98 °F) (Temporal)   Resp 16   Ht 1.575 m (5' 2\")   Wt 68 kg (150 lb)   SpO2 98%   BMI 27.44 kg/m²     Physical Exam  Vitals and nursing note reviewed.   Musculoskeletal:      Right hip: Tenderness present. No bony tenderness. Normal range of motion.      Right knee: Swelling and ecchymosis present. Decreased range of motion. Tenderness present over the medial joint line.      Comments: R knee: Patient is able to fully extend right knee. Limited ROM to right knee secondary to pain. No increased warmth or pain directly behind calf  Right hip: No obvious deformity,+TTP down right buttock and lateral aspect.  Full range of motion     Skin:     General: Skin is warm and dry.      Capillary Refill: Capillary refill takes less than 2 seconds.       Assessment/Plan:     Diagnosis and associated orders:   1. Fall from ground level        2. Sprain of right knee, unspecified ligament, subsequent encounter        3. Hip strain, right, subsequent encounter               Comments/MDM:   Pt is clinically stable at today's acute urgent care visit.  No acute distress noted. Appropriate for outpatient management at this time.     Work restrictions as provided  Crutches as needed  Continue use of knee brace and compression wrap  Ice, elevate, tylenol and ibuprofen  Flexeril as previously prescribed PRN   Follow up with [facility]  Follow up with Occ med           Discussed DDx, management options (risks,benefits, and alternatives to planned treatment), natural progression and supportive care.  Expressed understanding and the treatment plan was agreed upon. Questions were encouraged and answered   Return to urgent care prn if new or worsening sx or if there is no improvement in condition prn.    Educated in Red flags and indications to " immediately call 911 or present to the Emergency Department.   Advised the patient to follow-up with the primary care physician for recheck, reevaluation, and consideration of further management.    I personally reviewed prior external notes and test results pertinent to today's visit.  I have independently reviewed and interpreted all diagnostics ordered during this urgent care acute visit.       Please note that this dictation was created using voice recognition software. I have made a reasonable attempt to correct obvious errors, but I expect that there are errors of grammar and possibly content that I did not discover before finalizing the note.    This note was electronically signed by ADDIE Escudero

## 2022-11-18 NOTE — LETTER
[address] [facility] [place] [address]  Phone:  [phone] - Fax:  [phone]   [facility] Progress Report and Disability Certification  Date of Service: 11/18/2022   No Show:  No  Date / Time of Next Visit: 11/25/2022   Claim Information   Patient Name: Johanna Mata  Claim Number:     Employer:    Date of Injury: 11/7/2022     Insurer / TPA: [employer] Workers [employer]  ID / SSN:     Occupation:   Diagnosis: Diagnoses of Fall from ground level, Sprain of right knee, unspecified ligament, subsequent encounter, and Hip strain, right, subsequent encounter were pertinent to this visit.    Medical Information   Related to Industrial Injury? Yes    Subjective Complaints:  3rd visit 11/18/2022: Patient's condition remains the same.  She has been ambulatory without crutches.  She has used right knee brace and compression.  She has had consistent pain to right knee.  She reports pain is 5\10 to knee.  She does have associated numbness and tingling that extends from her knee down.  She has had continued pain to right hip.  She has been elevating, applying ice, and taking ibuprofen, flexeril, and oxycodone for pain.  Patient is pending orthopedic follow-up with [facility].  No previous injury or trauma to right knee. She has had previous labrum tear to her right hip.     ROS per HPI       Objective Findings: Physical Exam  Vitals and nursing note reviewed.   Musculoskeletal:      Right hip: Tenderness present. No bony tenderness. Normal range of motion.      Right knee: Swelling and ecchymosis present. Decreased range of motion. Tenderness present over the medial joint line.      Comments: R knee: Patient is able to fully extend right knee. Limited ROM to right knee secondary to pain. No increased warmth or pain directly behind calf  Right hip: No obvious deformity,+TTP down right buttock and lateral aspect.  Full range of motion     Skin:      General: Skin is warm and dry.      Capillary Refill: Capillary refill takes less than 2 seconds.       Pre-Existing Condition(s):     Assessment:   Condition Same    Status: Additional Care Required  Permanent Disability:No    Plan: Medication  Comments:ice, elevate, brace, Tylenol, ibuprofen, Flexeril as needed, follow-up with orthopedic surgeon    Diagnostics:      Comments:       Disability Information   Status: Released to Restricted Duty    From:  2022  Through: 2022 Restrictions are: Temporary   Physical Restrictions   Sitting:    Standing:  < or = to 1 hr/day Stooping:    Bendin hrs/day   Squattin hrs/day Walking:  < or = to 1 hr/day Climbin hrs/day Pushin hrs/day   Pulling:    Other:    Reaching Above Shoulder (L):   Reaching Above Shoulder (R):       Reaching Below Shoulder (L):    Reaching Below Shoulder (R):      Not to exceed Weight Limits   Carrying(hrs):   Weight Limit(lb):   Lifting(hrs):   Weight  Limit(lb):     Comments:      Repetitive Actions   Hands: i.e. Fine Manipulations from Grasping:     Feet: i.e. Operating Foot Controls:     Driving / Operate Machinery:     Health Care Provider’s Original or Electronic Signature  Ashtyn Estrada A.P.R.NRosalva Health Care Provider’s Original or Electronic Signature    Get Quinteros DO MPH     Clinic Name / Location: [address] [address] Clinic Phone Number: Dept: [phone]   Appointment Time: 9:30 Am Visit Start Time: 10:18 AM   Check-In Time:  9:28 Am Visit Discharge Time:  11:50AM   Original-Treating Physician or Chiropractor    Page 2-Insurer/TPA    Page 3-Employer    Page 4-Employee

## 2022-11-18 NOTE — LETTER
[address] [facility] [place] [address]  Phone:  [phone] - Fax:  [phone]   [facility] Progress Report and Disability Certification  Date of Service: 11/18/2022   No Show:  No  Date / Time of Next Visit: 11/22/2022   Claim Information   Patient Name: Johanna Mata  Claim Number:     Employer:    Date of Injury: 11/7/2022     Insurer / TPA: [employer]  ID / SSN:     Occupation:   Diagnosis: Diagnoses of Fall from ground level, Sprain of right knee, unspecified ligament, subsequent encounter, and Hip strain, right, subsequent encounter were pertinent to this visit.    Medical Information   Related to Industrial Injury? Yes    Subjective Complaints:  3rd visit 11/18/2022: Patient's condition remains the same.  She has been ambulatory without crutches.  She has used right knee brace and compression.  She has had consistent pain to right knee.  She reports pain is 5\10 to knee.  She does have associated numbness and tingling that extends from her knee down.  She has had continued pain to right hip.  She has been elevating, applying ice, and taking ibuprofen, flexeril, and oxycodone for pain.  Patient is pending orthopedic follow-up with [facility].  No previous injury or trauma to right knee. She has had previous labrum tear to her right hip.     ROS per HPI       Objective Findings: Physical Exam  Vitals and nursing note reviewed.   Musculoskeletal:      Right hip: Tenderness present. No bony tenderness. Normal range of motion.      Right knee: Swelling and ecchymosis present. Decreased range of motion. Tenderness present over the medial joint line.      Comments: R knee: Patient is able to fully extend right knee. Limited ROM to right knee secondary to pain. No increased warmth or pain directly behind calf  Right hip: No obvious deformity,+TTP down right buttock and lateral aspect.  Full range of motion     Skin:      General: Skin is warm and dry.      Capillary Refill: Capillary refill takes less than 2 seconds.       Pre-Existing Condition(s):     Assessment:   Condition Same    Status: Additional Care Required  Permanent Disability:No    Plan: Medication  Comments:ice, elevate, brace, Tylenol, ibuprofen, Flexeril as needed, follow-up with orthopedic surgeon    Diagnostics:      Comments:       Disability Information   Status: Released to Restricted Duty    From:  2022  Through: 2022 Restrictions are: Temporary   Physical Restrictions   Sitting:    Standing:  < or = to 1 hr/day Stooping:    Bendin hrs/day   Squattin hrs/day Walking:  < or = to 1 hr/day Climbin hrs/day Pushin hrs/day   Pulling:    Other:    Reaching Above Shoulder (L):   Reaching Above Shoulder (R):       Reaching Below Shoulder (L):    Reaching Below Shoulder (R):      Not to exceed Weight Limits   Carrying(hrs):   Weight Limit(lb):   Lifting(hrs):   Weight  Limit(lb):     Comments:      Repetitive Actions   Hands: i.e. Fine Manipulations from Grasping:     Feet: i.e. Operating Foot Controls:     Driving / Operate Machinery:     Health Care Provider’s Original or Electronic Signature  Ashtyn Estrada A.P.R.NRosal[place] Health Care Provider’s Original or Electronic Signature    Get Quinteros DO MPH     Clinic Name / Location: [address] [address] Clinic Phone Number: Dept: [phone]   Appointment Time: 9:30 Am Visit Start Time: 10:18 AM   Check-In Time:  9:28 Am Visit Discharge Time:     Original-Treating Physician or Chiropractor    Page 2-Insurer/TPA    Page 3-Employer    Page 4-Employee     - - -

## 2022-11-25 ENCOUNTER — OCCUPATIONAL MEDICINE (OUTPATIENT)
Dept: URGENT CARE | Facility: PHYSICIAN GROUP | Age: 27
End: 2022-11-25
Payer: OTHER MISCELLANEOUS

## 2022-11-25 ENCOUNTER — HOSPITAL ENCOUNTER (OUTPATIENT)
Dept: RADIOLOGY | Facility: MEDICAL CENTER | Age: 27
End: 2022-11-25
Attending: NURSE PRACTITIONER
Payer: OTHER MISCELLANEOUS

## 2022-11-25 ENCOUNTER — HOSPITAL ENCOUNTER (OUTPATIENT)
Dept: RADIOLOGY | Facility: MEDICAL CENTER | Age: 27
End: 2022-11-25
Attending: ORTHOPAEDIC SURGERY
Payer: OTHER MISCELLANEOUS

## 2022-11-25 VITALS
SYSTOLIC BLOOD PRESSURE: 122 MMHG | RESPIRATION RATE: 16 BRPM | BODY MASS INDEX: 24.11 KG/M2 | OXYGEN SATURATION: 98 % | HEART RATE: 84 BPM | DIASTOLIC BLOOD PRESSURE: 82 MMHG | TEMPERATURE: 97.6 F | WEIGHT: 150 LBS | HEIGHT: 66 IN

## 2022-11-25 DIAGNOSIS — S83.91XA SPRAIN OF RIGHT KNEE, UNSPECIFIED LIGAMENT, INITIAL ENCOUNTER: ICD-10-CM

## 2022-11-25 DIAGNOSIS — M24.151 ARTICULAR CARTILAGE DISORDER OF HIP, RIGHT: ICD-10-CM

## 2022-11-25 DIAGNOSIS — M25.551 RIGHT HIP PAIN: ICD-10-CM
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
## 2022-11-25 DIAGNOSIS — S83.91XD SPRAIN OF RIGHT KNEE, UNSPECIFIED LIGAMENT, SUBSEQUENT ENCOUNTER: ICD-10-CM

## 2022-11-25 DIAGNOSIS — S76.011D HIP STRAIN, RIGHT, SUBSEQUENT ENCOUNTER: ICD-10-CM

## 2022-11-25 PROCEDURE — 73721 MRI JNT OF LWR EXTRE W/O DYE: CPT

## 2022-11-25 PROCEDURE — 99213 OFFICE O/P EST LOW 20 MIN: CPT | Performed by: PHYSICIAN ASSISTANT

## 2022-11-25 PROCEDURE — 73721 MRI JNT OF LWR EXTRE W/O DYE: CPT | Mod: RT

## 2022-11-25 RX ORDER — METHYLPREDNISOLONE 4 MG/1
4 TABLET ORAL DAILY
Qty: 21 TABLET | Refills: 0 | Status: SHIPPED | OUTPATIENT
Start: 2022-11-25 | End: 2023-03-08

## 2022-11-25 NOTE — LETTER
"[facility] [place] [address]  [place] [address]  Phone:  [phone] - Fax:  [phone]   [facility] Progress Report and Disability Certification  Date of Service: 11/25/2022   No Show:  No  Date / Time of Next Visit:     Claim Information   Patient Name: Johanna Mata  Claim Number:     Employer:    Date of Injury: 11/7/2022     Insurer / TPA: [employer]  ID / SSN:     Occupation:   Diagnosis: Diagnoses of Fall from ground level, Sprain of right knee, unspecified ligament, subsequent encounter, and Hip strain, right, subsequent encounter were pertinent to this visit.    Medical Information   Related to Industrial Injury? Yes    Subjective Complaints:  ORIGINAL HPI COPIED DOI: 11/7/22 at 1230  Patient presents for her first WC evaluation following fall sustained today at approximately 1230. 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 The patient states she fell into the box, approximately 3 feet into the ground. She reports that her lower leg hit the edge of the box and scrapped her skin as she fell into the ground. Patient did make contact with the ground. She was able to bear weight after the fall, but it is very painful to put weight on her right lower extremity. Per patient, she is experiencing pain in her right hip, and knee. She rates pain as 8/10. She has not taken antiinflammatories or applied ice packs at this time. Patient reports right right hip is tender at the anterior groin and anterior and lateral upper leg. She reports her right knee is painful to move and touch at the medial aspect. She endorses decreased range of motion in her right hip, knee; denies radiation of pain, denies numbness/tingling. She reports bruising and a dry scrape of her lower leg. Patient also reports hip operation in June 2022. She had \"bone trimmed and her " "labral tear repaired.\"     4th follow-up visit:  Please recall that the patient has been referred to [facility] for follow-up, this is pending.  She was referred to occupational medicine at her last appointment.  MRIs of the hip and knee have been obtained, to discuss these with the patient in office today.  At this time she is seeing worsening symptoms over the right knee.  No reinjury since last office visit.  Has been wearing her knee brace and taking the previously discussed medications.   Objective Findings: Localized swelling present over the medial aspect of the left knee and over the proximal-medial one third of the tibia.  Knee range of motion intact.  Hip range of motion intact.  Tenderness to palpation over the right gluteal muscles and hip external rotators.  Lower extremity strength and sensation intact.   Pre-Existing Condition(s):     Assessment:   Condition Worsened    Status: Discharged / Care Transfer  Comments:Transfer to occupational medicine, appointment is scheduled on 12/1/2022.  Follow-up with orthopedics once scheduled  Permanent Disability:No    Plan:   Comments:Trial Medrol Dosepak, continue taking previously prescribed medications and over-the-counter medications for symptom support.  Work status updated    Diagnostics:      Comments:       Disability Information   Status: Released to Restricted Duty    From:     Through:   Restrictions are: Temporary   Physical Restrictions   Sitting:    Standing:    Stooping:    Bending:      Squatting:    Walking:    Climbing:    Pushing:      Pulling:    Other:    Reaching Above Shoulder (L):   Reaching Above Shoulder (R):       Reaching Below Shoulder (L):    Reaching Below Shoulder (R):      Not to exceed Weight Limits   Carrying(hrs):   Weight Limit(lb):   Lifting(hrs):   Weight  Limit(lb):     Comments: Please see [facility] duty status report that was filled out in office during patient's visit for work restrictions.    Repetitive Actions   Hands: " i.e. Fine Manipulations from Grasping:     Feet: i.e. Operating Foot Controls:     Driving / Operate Machinery:     Health Care Provider’s Original or Electronic Signature  Mynor Borges P.A.-C. Health Care Provider’s Original or Electronic Signature    Get Quinteros DO MPH     Clinic Name / Location: [address] [place] [address] Clinic Phone Number: Dept: [phone]   Appointment Time: 9:30 Am Visit Start Time: 9:24 AM   Check-In Time:  8:56 Am Visit Discharge Time:  10:25AM   Original-Treating Physician or Chiropractor    Page 2-Insurer/TPA    Page 3-Employer    Page 4-Employee

## 2022-11-25 NOTE — PROGRESS NOTES
"Subjective:     Johanna Mata is a 27 y.o. female who presents for Injury (WC FOLLOW-UP Scrapped R shin x3 weeks ago. Lump under knee won't go away and is bruising. )      ORIGINAL HPI COPIED DOI: 11/7/22 at 1230  Patient presents for her first WC evaluation following fall sustained today at approximately 1230. 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 The patient states she fell into the box, approximately 3 feet into the ground. She reports that her lower leg hit the edge of the box and scrapped her skin as she fell into the ground. Patient did make contact with the ground. She was able to bear weight after the fall, but it is very painful to put weight on her right lower extremity. Per patient, she is experiencing pain in her right hip, and knee. She rates pain as 8/10. She has not taken antiinflammatories or applied ice packs at this time. Patient reports right right hip is tender at the anterior groin and anterior and lateral upper leg. She reports her right knee is painful to move and touch at the medial aspect. She endorses decreased range of motion in her right hip, knee; denies radiation of pain, denies numbness/tingling. She reports bruising and a dry scrape of her lower leg. Patient also reports hip operation in June 2022. She had \"bone trimmed and her labral tear repaired.\"     4th follow-up visit:  Please recall that the patient has been referred to [facility] for follow-up, this is pending.  She was referred to occupational medicine at her last appointment.  MRIs of the hip and knee have been obtained, to discuss these with the patient in office today.  At this time she is seeing worsening symptoms over the right knee.  No reinjury since last office visit.  Has been wearing her knee brace and taking the previously discussed medications.    PMH:   No pertinent past medical history to this " "problem  MEDS:  Medications were reviewed in EMR  ALLERGIES:  Allergies were reviewed in EMR  SOCHX:  Works as a   FH:   No pertinent family history to this problem       Objective:     /82   Pulse 84   Temp 36.4 °C (97.6 °F)   Resp 16   Ht 1.676 m (5' 6\")   Wt 68 kg (150 lb)   SpO2 98%   BMI 24.21 kg/m²     Localized swelling present over the medial aspect of the left knee and over the proximal-medial one third of the tibia.  Knee range of motion intact.  Hip range of motion intact.  Tenderness to palpation over the right gluteal muscles and hip external rotators.  Lower extremity strength and sensation intact.    Assessment/Plan:       1. Fall from ground level    2. Sprain of right knee, unspecified ligament, subsequent encounter  - methylPREDNISolone (MEDROL DOSEPAK) 4 MG Tablet Therapy Pack; Take 1 Tablet by mouth every day. Follow schedule on package instructions.  Dispense: 21 Tablet; Refill: 0    3. Hip strain, right, subsequent encounter  - methylPREDNISolone (MEDROL DOSEPAK) 4 MG Tablet Therapy Pack; Take 1 Tablet by mouth every day. Follow schedule on package instructions.  Dispense: 21 Tablet; Refill: 0    Released to Restricted Duty FROM   TO    [facility] out in office during patient's visit for work restrictions.       Plan:  - Follow-up with occupational medicine as scheduled on 12/1/2022.  Follow-up with orthopedics once appointment is scheduled.    Differential diagnosis, natural history, supportive care, and indications for immediate follow-up discussed.    Mynor Borges PA-C    "

## 2022-12-01 ENCOUNTER — OCCUPATIONAL MEDICINE (OUTPATIENT)
Dept: OCCUPATIONAL MEDICINE | Facility: CLINIC | Age: 27
End: 2022-12-01
Payer: OTHER MISCELLANEOUS

## 2022-12-01 VITALS
TEMPERATURE: 98.2 F | BODY MASS INDEX: 24.11 KG/M2 | RESPIRATION RATE: 14 BRPM | HEART RATE: 104 BPM | HEIGHT: 66 IN | WEIGHT: 150 LBS | OXYGEN SATURATION: 100 %

## 2022-12-01 DIAGNOSIS — S73.191D TEAR OF RIGHT ACETABULAR LABRUM, SUBSEQUENT ENCOUNTER: ICD-10-CM

## 2022-12-01 DIAGNOSIS — S80.01XD CONTUSION OF RIGHT KNEE, SUBSEQUENT ENCOUNTER: ICD-10-CM

## 2022-12-01 PROCEDURE — 99203 OFFICE O/P NEW LOW 30 MIN: CPT | Performed by: PREVENTIVE MEDICINE

## 2022-12-01 NOTE — LETTER
[address],   [address] [place] [address]  Phone:  [phone] - Fax:  [phone]   Occupational Health [facility] Progress Report and Disability Certification  Date of Service: 12/1/2022   No Show:  No  Date / Time of Next Visit:  [facility]   Claim Information   Patient Name: Johanna Mata  Claim Number:     Employer:    Date of Injury: 11/7/2022     Insurer / TPA: [employer]  ID / SSN:     Occupation:   Diagnosis: Diagnoses of Tear of right acetabular labrum, subsequent encounter and Contusion of right knee, subsequent encounter were pertinent to this visit.    Medical Information   Related to Industrial Injury? 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 The patient states she fell into the box, approximately 3 feet into the ground. She reports that her lower leg hit the edge of the box and scrapped her skin as she fell into the ground.  X-rays of the knee and hip were negative for acute findings.  She does have a history of right hip arthroscopy due to labral tear a few months ago.  She was initially seen at [facility] where MRIs were ordered.  She then file work comp claim and saw urgent care.  Referral to orthopedics was placed.  However the [facility] does not take federal work comp and so she was approved to see the [facility] clinic, has an appoint with an orthopedist there in 2 weeks.  Patient notes continued right hip pain extending from the posterior aspect to the lateral aspect.  Pain also feels deep within the hip joint.  Similar to before she had the surgery.  She also notes that she continues with significant right knee pain mostly in the medial aspect.  She notes increased pain with ambulation.   Objective Findings: Right knee: Mild to  moderate effusion over the medial knee.  Tenderness to palpation with light touch throughout the medial knee and proximal tibia.  Mildly decreased range of motion due to pain.  Right hip: No gross deformity.  Area tenderness over the SI joint through the greater trochanter.  MRI right hip: Previous tear of the right anterior superior labrum is redemonstrated with slightly more fraying and irregularity.     2. Mild prominence of the femoral head neck junction suggests predisposition for femoro-acetabular impingement.     3. Mild tendinosis of the gluteus minimus tendon insertion    MRI Right KNee:     1. Intact cruciate and collateral ligaments.     2. Intact anterior horn, body, posterior horn of the medial and lateral menisci.     3. Subcutaneous contusion and edema in the medial knee. No hematoma or fluid collection identified.     4. No fracture or bone marrow edema. No osteoarthritis.   Pre-Existing Condition(s):     Assessment:   Condition Same    Status: Discharged / Care Transfer  Permanent Disability:No    Plan:      Diagnostics:      Comments:  MRI right knee consistent with contusion of the medial knee  MRI of the right hip shows worsening tear of the labrum at the right hip  Transfer care to orthopedics  Continue hinged knee brace  OTC ibuprofen/Tylenol as needed  Restricted duty, until cleared by orthopedics  Follow-up as needed    Disability Information   Status: Released to Restricted Duty    From:  2022  Through:   Restrictions are: Temporary   Physical Restrictions   Sitting:    Standing:  < or = to 2 hrs/day Stooping:    Bending:      Squattin hrs/day Walking:  < or = to 2 hrs/day Climbin hrs/day Pushing:  < or = to 2 hrs/day   Pulling:  < or = to 2 hrs/day Other:    Reaching Above Shoulder (L):   Reaching Above Shoulder (R):       Reaching Below Shoulder (L):    Reaching Below Shoulder (R):      Not to exceed Weight Limits   Carrying(hrs):   Weight Limit(lb): < or = to 10 pounds  Lifting(hrs):   Weight  Limit(lb): < or = to 10 pounds   Comments: Until cleared by orthopedics    Repetitive Actions   Hands: i.e. Fine Manipulations from Grasping:     Feet: i.e. Operating Foot Controls:     Driving / Operate Machinery: < or = to 1 hr/day   Health Care Provider’s Original or Electronic Signature  Get Quinteros D.O. Health Care Provider’s Original or Electronic Signature    Get Quinteros DO MPH     Clinic Name / Location: [address],   [address] [facility]  [place] [address] Clinic Phone Number: Dept: [phone]   Appointment Time: 4:15 Pm Visit Start Time: 3:48 PM   Check-In Time:  3:35 Pm Visit Discharge Time:  4:18PM   Original-Treating Physician or Chiropractor    Page 2-Insurer/TPA    Page 3-Employer    Page 4-Employee

## 2022-12-02 NOTE — PROGRESS NOTES
"Subjective:     Johanna Mata is a 27 y.o. 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 The patient states she fell into the box, approximately 3 feet into the ground. She reports that her lower leg hit the edge of the box and scrapped her skin as she fell into the ground.  X-rays of the knee and hip were negative for acute findings.  She does have a history of right hip arthroscopy due to labral tear a few months ago.  She was initially seen at [facility] where MRIs were ordered.  She then file work comp claim and saw urgent care.  Referral to orthopedics was placed.  However the [facility] does not take federal work comp and so she was approved to see the [facility] clinic, has an appoint with an orthopedist there in 2 weeks.  Patient notes continued right hip pain extending from the posterior aspect to the lateral aspect.  Pain also feels deep within the hip joint.  Similar to before she had the surgery.  She also notes that she continues with significant right knee pain mostly in the medial aspect.  She notes increased pain with ambulation.    ROS: All systems were reviewed on intake form, form was reviewed and signed. See scanned documents in media. Pertinent positives and negatives included in HPI.    PMH: No pertinent past medical history to this problem  MEDS: Medications were reviewed in Epic  ALLERGIES: No Known Allergies  SOCHX: Works as  at [employer]  FH: No pertinent family history to this problem       Objective:     Pulse (!) 104   Temp 36.8 °C (98.2 °F) (Temporal)   Resp 14   Ht 1.676 m (5' 6\")   Wt 68 kg (150 lb)   SpO2 100%   BMI 24.21 kg/m²     Constitutional: Patient is in no acute distress. Appears well-developed and well-nourished.   HENT: Normocephalic and atraumatic. EOM are normal. No " scleral icterus.   Cardiovascular: Normal rate.    Pulmonary/Chest: Effort normal. No respiratory distress.   Neurological: Patient is alert and oriented to person, place, and time.   Skin: Skin is warm and dry.   Psychiatric: Normal mood and affect. Behavior is normal.     Right knee: Mild to moderate effusion over the medial knee.  Tenderness to palpation with light touch throughout the medial knee and proximal tibia.  Mildly decreased range of motion due to pain.  Right hip: No gross deformity.  Area tenderness over the SI joint through the greater trochanter.  MRI right hip: Previous tear of the right anterior superior labrum is redemonstrated with slightly more fraying and irregularity.     2. Mild prominence of the femoral head neck junction suggests predisposition for femoro-acetabular impingement.     3. Mild tendinosis of the gluteus minimus tendon insertion    MRI Right KNee:     1. Intact cruciate and collateral ligaments.     2. Intact anterior horn, body, posterior horn of the medial and lateral menisci.     3. Subcutaneous contusion and edema in the medial knee. No hematoma or fluid collection identified.     4. No fracture or bone marrow edema. No osteoarthritis.    Assessment/Plan:       1. Tear of right acetabular labrum, subsequent encounter    2. Contusion of right knee, subsequent encounter    Released to Restricted Duty FROM 12/1/2022 TO    Until cleared by orthopedics  MRI right knee consistent with contusion of the medial knee  MRI of the right hip shows worsening tear of the labrum at the right hip  Transfer care to orthopedics  Continue hinged knee brace  OTC ibuprofen/Tylenol as needed  Restricted duty, until cleared by orthopedics  Follow-up as needed    Differential diagnosis, natural history, supportive care, and indications for immediate follow-up discussed.    Approximately 35 minutes were spent in reviewing notes, preparing for visit, obtaining history, exam and evaluation, patient  counseling/education and post visit documentation/orders.

## 2023-01-31 DIAGNOSIS — Z00.00 WELL ADULT EXAM: ICD-10-CM

## 2023-02-10 LAB — HBA1C MFR BLD: 5.5 % (ref ?–5.8)

## 2023-02-13 PROBLEM — E78.5 DYSLIPIDEMIA: Chronic | Status: ACTIVE | Noted: 2020-09-01

## 2023-03-08 ENCOUNTER — OFFICE VISIT (OUTPATIENT)
Dept: MEDICAL GROUP | Facility: PHYSICIAN GROUP | Age: 28
End: 2023-03-08
Payer: COMMERCIAL

## 2023-03-08 VITALS
TEMPERATURE: 98 F | HEIGHT: 66 IN | SYSTOLIC BLOOD PRESSURE: 110 MMHG | WEIGHT: 158 LBS | BODY MASS INDEX: 25.39 KG/M2 | OXYGEN SATURATION: 97 % | DIASTOLIC BLOOD PRESSURE: 60 MMHG | HEART RATE: 82 BPM

## 2023-03-08 DIAGNOSIS — E78.5 DYSLIPIDEMIA: Chronic | ICD-10-CM

## 2023-03-08 DIAGNOSIS — G89.29 CHRONIC LOW BACK PAIN, UNSPECIFIED BACK PAIN LATERALITY, UNSPECIFIED WHETHER SCIATICA PRESENT: ICD-10-CM

## 2023-03-08 DIAGNOSIS — Z01.419 WELL WOMAN EXAM: ICD-10-CM

## 2023-03-08 DIAGNOSIS — M54.50 CHRONIC LOW BACK PAIN, UNSPECIFIED BACK PAIN LATERALITY, UNSPECIFIED WHETHER SCIATICA PRESENT: ICD-10-CM

## 2023-03-08 DIAGNOSIS — Z98.890 STATUS POST SURGERY: ICD-10-CM

## 2023-03-08 DIAGNOSIS — E55.9 VITAMIN D DEFICIENCY: ICD-10-CM

## 2023-03-08 PROCEDURE — 99214 OFFICE O/P EST MOD 30 MIN: CPT | Performed by: INTERNAL MEDICINE

## 2023-03-08 NOTE — PROGRESS NOTES
PRIMARY CARE CLINIC VISIT    Chief complaint:    Follow-up hyperlipidemia  Low back pain  Vitamin D deficiency  Referral to GYN for well woman exam    History of Present Illness     Dyslipidemia  This is a new condition.  Recent lab test completed 2/13/2023 at [facility] show cholesterol 227.    Lab test result discussed with the patient    Potential risks of uncontrolled hyperlipidemia discussed with the patient.    At this time I do not recommend medication.  Recommend however patient to start diet and exercise and the patient has agreed to come back for follow-up in 3 months to repeat the cholesterol panel.    Chronic low back pain  Chronic ongoing condition.  The patient followed by pain and spine specialist.  The patient takes oxycodone as needed.  Patient tolerating medication well.  No significant side effects noted.    Status post surgery  Patient is status post right hip surgery June 2022.  Patient was diagnosed with femoral acetabular impingement syndrome.  Patient followed by orthopedic specialist.  Patient takes oxycodone as needed.  Patient has done physical therapy.    Vitamin D deficiency  This is a chronic condition.  The patient presently not taking vitamin D supplementation.  Lab test requested for follow-up    Current Outpatient Medications on File Prior to Visit   Medication Sig Dispense Refill    meloxicam (MOBIC) 15 MG tablet TAKE 1 TABLET BY MOUTH EVERY DAY 30 Tablet 1    medroxyPROGESTERone 150 MG/ML Suspension Prefilled Syringe       oxyCODONE immediate-release (ROXICODONE) 5 MG Tab Take 1 Tablet by mouth 2 times a day as needed.      triamcinolone acetonide (KENALOG) 0.1 % Ointment Apply to affected area twice daily as needed sparingly as directed. 80 g 3    acetaminophen (TYLENOL) 500 MG Tab Take 2 Tablets by mouth 2 times a day. Indications: Pain       No current facility-administered medications on file prior to visit.        Allergies: Patient has no known allergies.    Current  Outpatient Medications Ordered in Epic   Medication Sig Dispense Refill    meloxicam (MOBIC) 15 MG tablet TAKE 1 TABLET BY MOUTH EVERY DAY 30 Tablet 1    medroxyPROGESTERone 150 MG/ML Suspension Prefilled Syringe       oxyCODONE immediate-release (ROXICODONE) 5 MG Tab Take 1 Tablet by mouth 2 times a day as needed.      triamcinolone acetonide (KENALOG) 0.1 % Ointment Apply to affected area twice daily as needed sparingly as directed. 80 g 3    acetaminophen (TYLENOL) 500 MG Tab Take 2 Tablets by mouth 2 times a day. Indications: Pain       No current Epic-ordered facility-administered medications on file.       No past medical history on file.    Past Surgical History:   Procedure Laterality Date    OH ARTHROSCOPY HIP W/LABRAL REPAIR Right 6/16/2022    Procedure: Right hip arthroscopy, labral repair, acetabular rim trimming, femoral neck osteochondroplasty;  Surgeon: Tejinder Solis M.D.;  Location: [facility];  Service: Orthopedics    OH ARTHROSCOPY HIP W/FEMOROPLASTY Right 6/16/2022    Procedure: OSTEOPLASTY, FEMUR, NECK;  Surgeon: Tejinder Solis M.D.;  Location: [facility];  Service: Orthopedics    OH ARTHROSCOPY HIP W/ACETABULOPLASTY Right 6/16/2022    Procedure: OSTEOPLASTY, ACETABULUM;  Surgeon: Tejinder Solis M.D.;  Location: [facility];  Service: Orthopedics    GYN SURGERY  5/10/2014    Performed by Mindi Germain M.D. at [facility]       Family History   Problem Relation Age of Onset    Diabetes Maternal Grandmother     Hypertension Maternal Grandmother     Cancer Maternal Grandfather 60        colon cancer    Diabetes Paternal Grandmother     Heart Disease Paternal Grandfather     Diabetes Paternal Grandfather 50        fatal MI    Hyperlipidemia Mother     Diabetes Mother     Lung Disease Mother         asthma    Hypertension Mother     Hypertension Father     Lung Disease Brother        Social  "History     Tobacco Use   Smoking Status Former    Packs/day: 0.25    Years: 10.00    Pack years: 2.50    Types: Cigarettes    Quit date: 10/24/2021    Years since quittin.3   Smokeless Tobacco Never   Tobacco Comments    5-6 cigs a day       Social History     Substance and Sexual Activity   Alcohol Use Not Currently    Alcohol/week: 4.8 - 6.0 oz    Types: 8 - 10 Shots of liquor per week    Comment: Weekends only       Review of systems.  As per HPI above. All other systems reviewed and negative.      Past Medical, Social, and Family history reviewed and updated in EPIC     Objective     /60 (BP Location: Left arm, Patient Position: Sitting, BP Cuff Size: Adult)   Pulse 82   Temp 36.7 °C (98 °F) (Temporal)   Ht 1.676 m (5' 6\")   Wt 71.7 kg (158 lb)   SpO2 97%    Body mass index is 25.5 kg/m².    General: alert in no apparent distress.  Cardiovascular: regular rate and rhythm  Pulmonary: lungs : no wheezing   Gastrointestinal: BS present. No obvious mass noted  Nose with slight mucosal inflammation no purulent drainage  Oropharynx no exudate    Lab Results   Component Value Date/Time    HBA1C 5.5 02/10/2023 12:00 AM       Lab Results   Component Value Date/Time    WBC 6.1 2018 08:57 AM    WBC 10.3 (H) 2009 03:32 PM    HEMOGLOBIN 15.0 2018 08:57 AM    HEMATOCRIT 47.3 (H) 2018 08:57 AM    MCV 89.6 2018 08:57 AM    MCV 89 2009 03:32 PM    PLATELETCT 284 2018 08:57 AM         Lab Results   Component Value Date/Time    SODIUM 142 2018 08:57 AM    POTASSIUM 4.4 2018 08:57 AM    GLUCOSE 93 2018 08:57 AM    BUN 14 2018 08:57 AM    CREATININE 0.69 2018 08:57 AM           Lab Results   Component Value Date/Time    ALTSGPT 16 2018 08:57 AM             Assessment and Plan     1. Dyslipidemia  This is a new condition.  Uncontrolled.  Recommend patient to start low-fat low-cholesterol diet and exercise activities.  Recommend follow-up in " 3 to 4 months to repeat the cholesterol panel.    - Basic Metabolic Panel; Future  - Lipid Profile; Future    2. Chronic low back pain, unspecified back pain laterality, unspecified whether sciatica present  This is a chronic condition.  Stable  - Advised pt re: neck/ back care, posture and regular stretching exercises.   Rec to maintain ideal weight.   Ice/heat application as directed.  Avoid heavy lifting or activities which may aggravate pt's condition.   Consider the use of over the counter topical treatment such as Icy/hot or Biofreeze    The patient to follow-up with spine and pain specialist      3. Status post surgery  Patient is status post hip surgery.  Her condition is relatively stable.  Advised the patient regarding gentle stretching exercises.  Continue follow-up with orthopedic specialist as directed.    4. Vitamin D deficiency  Chronic condition.  Current status unclear.  Lab test requested.  - VITAMIN D,25 HYDROXY (DEFICIENCY); Future    5. Well woman exam  - Referral to Gynecology      Total time:   32  min -  That includes time for chart review before the visit, the actual patient visit, and time spent on documentation in EMR after the visit.  Chart review/prep, review of other providers' records, imaging/lab review, face-to-face time for history/examination, ordering, prescribing,  review of results/meds/ treatment plan with patient, and care coordination.             Please note that this dictation was created using voice recognition software. I have made every reasonable attempt to correct obvious errors, but I expect that there are errors of grammar and possibly content that I did not discover before finalizing the note.    Bong Kessler MD  Internal Medicine  [facility]

## 2023-03-08 NOTE — ASSESSMENT & PLAN NOTE
This is a chronic condition.  The patient presently not taking vitamin D supplementation.  Lab test requested for follow-up

## 2023-03-08 NOTE — ASSESSMENT & PLAN NOTE
This is a new condition.  Recent lab test completed 2/13/2023 at [facility] show cholesterol 227.    Lab test result discussed with the patient    Potential risks of uncontrolled hyperlipidemia discussed with the patient.    At this time I do not recommend medication.  Recommend however patient to start diet and exercise and the patient has agreed to come back for follow-up in 3 months to repeat the cholesterol panel.

## 2023-03-08 NOTE — ASSESSMENT & PLAN NOTE
Chronic ongoing condition.  The patient followed by pain and spine specialist.  The patient takes oxycodone as needed.  Patient tolerating medication well.  No significant side effects noted.

## 2023-03-26 ENCOUNTER — OFFICE VISIT (OUTPATIENT)
Dept: URGENT CARE | Facility: PHYSICIAN GROUP | Age: 28
End: 2023-03-26
Payer: COMMERCIAL

## 2023-03-26 VITALS
BODY MASS INDEX: 25.39 KG/M2 | WEIGHT: 158 LBS | DIASTOLIC BLOOD PRESSURE: 64 MMHG | TEMPERATURE: 98 F | OXYGEN SATURATION: 95 % | HEIGHT: 66 IN | RESPIRATION RATE: 18 BRPM | HEART RATE: 82 BPM | SYSTOLIC BLOOD PRESSURE: 124 MMHG

## 2023-03-26 DIAGNOSIS — B96.89 ACUTE BACTERIAL SINUSITIS: ICD-10-CM

## 2023-03-26 DIAGNOSIS — J01.90 ACUTE BACTERIAL SINUSITIS: ICD-10-CM

## 2023-03-26 DIAGNOSIS — R05.1 ACUTE COUGH: ICD-10-CM

## 2023-03-26 PROCEDURE — 99213 OFFICE O/P EST LOW 20 MIN: CPT | Performed by: PHYSICIAN ASSISTANT

## 2023-03-26 RX ORDER — AMOXICILLIN AND CLAVULANATE POTASSIUM 875; 125 MG/1; MG/1
1 TABLET, FILM COATED ORAL 2 TIMES DAILY
Qty: 14 TABLET | Refills: 0 | Status: SHIPPED | OUTPATIENT
Start: 2023-03-26 | End: 2023-04-02

## 2023-03-26 RX ORDER — AMOXICILLIN AND CLAVULANATE POTASSIUM 875; 125 MG/1; MG/1
1 TABLET, FILM COATED ORAL 2 TIMES DAILY
Qty: 14 TABLET | Refills: 0 | Status: SHIPPED | OUTPATIENT
Start: 2023-03-26 | End: 2023-03-26

## 2023-03-26 RX ORDER — DEXTROMETHORPHAN HYDROBROMIDE AND PROMETHAZINE HYDROCHLORIDE 15; 6.25 MG/5ML; MG/5ML
5 SYRUP ORAL EVERY 6 HOURS PRN
Qty: 118 ML | Refills: 0 | Status: SHIPPED | OUTPATIENT
Start: 2023-03-26 | End: 2023-04-02

## 2023-03-26 RX ORDER — DEXTROMETHORPHAN HYDROBROMIDE AND PROMETHAZINE HYDROCHLORIDE 15; 6.25 MG/5ML; MG/5ML
5 SYRUP ORAL EVERY 6 HOURS PRN
Qty: 118 ML | Refills: 0 | Status: SHIPPED | OUTPATIENT
Start: 2023-03-26 | End: 2023-03-26

## 2023-03-26 ASSESSMENT — ENCOUNTER SYMPTOMS
FEVER: 1
SPUTUM PRODUCTION: 1
ABDOMINAL PAIN: 0
CONSTIPATION: 0
EYE PAIN: 0
DIARRHEA: 0
HEADACHES: 1
SHORTNESS OF BREATH: 0
SORE THROAT: 1
COUGH: 1
MYALGIAS: 0
VOMITING: 0
NAUSEA: 0
SINUS PAIN: 1
CHILLS: 1

## 2023-03-26 NOTE — PROGRESS NOTES
"Subjective:   Johanna Mata is a 28 y.o. female who presents for Cough (X 1.5 wk cough, runny nose, nausea, chills, sore throat)      10 days of uri symptoms, started with cough, congestion, malaise, fatigue, sore throat.  Felt like she was improving slightly however last 2-3 days noted more sinus pressure drainage. Taking mucinex, tylenol with mild improvement.    Review of Systems   Constitutional:  Positive for chills, fever and malaise/fatigue.   HENT:  Positive for congestion, sinus pain and sore throat. Negative for ear pain.    Eyes:  Negative for pain.   Respiratory:  Positive for cough and sputum production. Negative for shortness of breath.    Cardiovascular:  Negative for chest pain.   Gastrointestinal:  Negative for abdominal pain, constipation, diarrhea, nausea and vomiting.   Genitourinary:  Negative for dysuria.   Musculoskeletal:  Negative for myalgias.   Skin:  Negative for rash.   Neurological:  Positive for headaches.     Medications, Allergies, and current problem list reviewed today in Epic.     Objective:     /64 (BP Location: Left arm, Patient Position: Sitting, BP Cuff Size: Adult)   Pulse 82   Temp 36.7 °C (98 °F) (Temporal)   Resp 18   Ht 1.676 m (5' 6\")   Wt 71.7 kg (158 lb)   SpO2 95%     Physical Exam  Vitals reviewed.   Constitutional:       Appearance: Normal appearance.   HENT:      Head: Normocephalic and atraumatic.      Right Ear: Tympanic membrane, ear canal and external ear normal.      Left Ear: Tympanic membrane, ear canal and external ear normal.      Nose: Congestion and rhinorrhea present.      Comments: Maxillary sinus ttp     Mouth/Throat:      Mouth: Mucous membranes are moist.   Eyes:      Conjunctiva/sclera: Conjunctivae normal.   Cardiovascular:      Rate and Rhythm: Normal rate and regular rhythm.   Pulmonary:      Effort: Pulmonary effort is normal.      Breath sounds: Normal breath sounds.   Musculoskeletal:      Cervical back: Normal " range of motion.   Lymphadenopathy:      Cervical: Cervical adenopathy present.   Skin:     General: Skin is warm and dry.      Capillary Refill: Capillary refill takes less than 2 seconds.   Neurological:      Mental Status: She is alert and oriented to person, place, and time.       Assessment/Plan:     Diagnosis and associated orders:     1. Acute bacterial sinusitis  amoxicillin-clavulanate (AUGMENTIN) 875-125 MG Tab      2. Acute cough  promethazine-dextromethorphan (PROMETHAZINE-DM) 6.25-15 MG/5ML syrup         Comments/MDM:     Patient meets IDSA guidelines for ABRS given duration of symptoms, worsening severity, clinical history and physical exam  Consider antihistamine, nasal steroid, anti-inflammatory, and saline rinses  No evidence of venous sinus thrombosis or more serious etiology  Typically these infections display a slow resolution of symptoms starting at 48-72 hours of treatment.  Mild pressure and pain may continue at end of week of antibiotics which is normal and continue the other supportive care until back to baseline.  Patient warned of sedation from cough medicine         Differential diagnosis, natural history, supportive care, and indications for immediate follow-up discussed.    Advised the patient to follow-up with the primary care physician for recheck, reevaluation, and consideration of further management.    Please note that this dictation was created using voice recognition software. I have made a reasonable attempt to correct obvious errors, but I expect that there are errors of grammar and possibly content that I did not discover before finalizing the note.    This note was electronically signed by Jamarcus Warren PA-C

## 2023-05-09 RX ORDER — TRIAMCINOLONE ACETONIDE 1 MG/G
OINTMENT TOPICAL
Qty: 80 G | Refills: 3 | Status: SHIPPED | OUTPATIENT
Start: 2023-05-09 | End: 2024-02-14

## 2023-07-17 DIAGNOSIS — E78.5 DYSLIPIDEMIA: Chronic | ICD-10-CM

## 2023-08-30 ENCOUNTER — GYNECOLOGY VISIT (OUTPATIENT)
Dept: OBGYN | Facility: CLINIC | Age: 28
End: 2023-08-30
Payer: COMMERCIAL

## 2023-08-30 ENCOUNTER — HOSPITAL ENCOUNTER (OUTPATIENT)
Facility: MEDICAL CENTER | Age: 28
End: 2023-08-30
Attending: STUDENT IN AN ORGANIZED HEALTH CARE EDUCATION/TRAINING PROGRAM
Payer: COMMERCIAL

## 2023-08-30 VITALS
DIASTOLIC BLOOD PRESSURE: 81 MMHG | SYSTOLIC BLOOD PRESSURE: 124 MMHG | WEIGHT: 150 LBS | HEIGHT: 66 IN | BODY MASS INDEX: 24.11 KG/M2

## 2023-08-30 DIAGNOSIS — Z01.419 ENCOUNTER FOR WELL WOMAN EXAM: ICD-10-CM

## 2023-08-30 DIAGNOSIS — Z30.09 BIRTH CONTROL COUNSELING: ICD-10-CM

## 2023-08-30 DIAGNOSIS — Z12.4 SCREENING FOR MALIGNANT NEOPLASM OF CERVIX: ICD-10-CM

## 2023-08-30 PROCEDURE — 3079F DIAST BP 80-89 MM HG: CPT | Performed by: STUDENT IN AN ORGANIZED HEALTH CARE EDUCATION/TRAINING PROGRAM

## 2023-08-30 PROCEDURE — 3074F SYST BP LT 130 MM HG: CPT | Performed by: STUDENT IN AN ORGANIZED HEALTH CARE EDUCATION/TRAINING PROGRAM

## 2023-08-30 PROCEDURE — 87625 HPV TYPES 16 & 18 ONLY: CPT

## 2023-08-30 PROCEDURE — 99385 PREV VISIT NEW AGE 18-39: CPT | Performed by: STUDENT IN AN ORGANIZED HEALTH CARE EDUCATION/TRAINING PROGRAM

## 2023-08-30 PROCEDURE — 87624 HPV HI-RISK TYP POOLED RSLT: CPT

## 2023-08-30 PROCEDURE — 88175 CYTOPATH C/V AUTO FLUID REDO: CPT

## 2023-08-30 RX ORDER — NORGESTIMATE AND ETHINYL ESTRADIOL 0.25-0.035
1 KIT ORAL DAILY
Qty: 84 TABLET | Refills: 3 | Status: SHIPPED | OUTPATIENT
Start: 2023-08-30 | End: 2023-08-30

## 2023-08-30 RX ORDER — NORGESTIMATE AND ETHINYL ESTRADIOL 0.25-0.035
1 KIT ORAL DAILY
Qty: 84 TABLET | Refills: 3 | Status: SHIPPED | OUTPATIENT
Start: 2023-08-30

## 2023-08-30 NOTE — PROGRESS NOTES
ANNUAL GYNECOLOGY VISIT    Chief Complaint  New Patient (WWE WITH PAP)      Subjective  Johanna Mata is a 28 y.o. female  Patient's last menstrual period was 2023 (exact date). Currently she is using depo for contraception. Would like to discuss other birth control options. States with depo she is having heavier menses. Also, notes that she currently is not sexually active. She presents today for  her Annual Exam.    Denies hx of migraines with aura. Denies tobacco use. Denies any personal hx or family hx of VTE/clotting disorders.       Occupation:      Preventive Care   Immunization History   Administered Date(s) Administered    Dtap Vaccine 1993, 1993, 1993, 1993, 1993, 1994, 1998    HPV Quadrivalent Vaccine (GARDASIL) - HISTORICAL DATA 2009, 2010, 2010    Hepatitis A Vaccine, Adult 2002, 2003    Hepatitis B Vaccine Adolescent/Pediatric 1993, 1993, 1993    Hib Vaccine (Prp-d) - HISTORICAL DATA 1993, 1993, 1993, 1994    INFLUENZA TIV (IM) 10/03/2013    Influenza (IM) Preservative Free - HISTORICAL DATA 10/16/2010    Influenza Seasonal Injectable - Historical Data 10/03/2013    Influenza Vac Subunit Quad Inj (Pf) 2022    Influenza Vaccine Quad Inj (Pf) 2018, 10/31/2021    Influenza Vaccine Quad Inj (Preserved) 2014, 2016    MMR Vaccine 1994, 1998    MODERNA SARS-COV-2 VACCINE (12+) 2022    Meningococcal Polysaccharide Vaccine MPSV4 - HISTORICAL DATA 2008    OPV TRIVALENT - HISTORICAL DATA (GIVEN PRIOR TO MAY 2016) 1993, 1993, 1993, 1994, 1998    PFIZER BIVALENT SARS-COV-2 VACCINE (12+) 2022    Pneumococcal polysaccharide vaccine (PPSV-23) 2014    Tdap Vaccine 2008, 2013, 2014    Tuberculin Skin Test 2013, 2013, 2014    Varicella  Vaccine Live 1998    ZZZInfluenza Vaccine Pediatric Preserv Free 2009       Guardasil HPV vaccine: UTD    Gynecology History and ROS  Current Sexual Activity: no  History of sexually transmitted diseases? no  Abnormal discharge? no  Current Contraception:  depo    Menstrual History  Patient's last menstrual period was 2023 (exact date).  Periods are regular  q 30 days, lasting 10-14 days.   Clots or heavy flow: yes  Dysmenorrhea: no  Intermenstrual bleeding/spotting: no  Significant pain with periods:no  Bothersome PMS symptoms: no  Significant Pelvic Pain: no    Pap History  Last pap smear: 01/15/2019 NILM; HPV negative  History of moderate or severe dysplasia: no    Cancer Risk Assessement:  Family history of:   - Breast cancer: no   - Ovarian cancer: no   - Uterine cancer: no   - Colon cancer: maternal grandfather    Obstetric History  OB History    Para Term  AB Living   2 2       2   SAB IAB Ectopic Molar Multiple Live Births                    # Outcome Date GA Lbr Deshawn/2nd Weight Sex Delivery Anes PTL Lv   2 Para      Vag-Spont      1 Para      Vag-Spont         Obstetric Comments   6 and 7 yr old daughters       Past Medical History  History reviewed. No pertinent past medical history.    Past Surgical History  Past Surgical History:   Procedure Laterality Date    FL ARTHROSCOPY HIP W/LABRAL REPAIR Right 2022    Procedure: Right hip arthroscopy, labral repair, acetabular rim trimming, femoral neck osteochondroplasty;  Surgeon: Tejinder Solis M.D.;  Location: [facility];  Service: Orthopedics    FL ARTHROSCOPY HIP W/FEMOROPLASTY Right 2022    Procedure: OSTEOPLASTY, FEMUR, NECK;  Surgeon: Tejinder Solis M.D.;  Location: [facility];  Service: Orthopedics    FL ARTHROSCOPY HIP W/ACETABULOPLASTY Right 2022    Procedure: OSTEOPLASTY, ACETABULUM;  Surgeon: Tejinder Solis M.D.;  Location: [facility];  Service: Orthopedics    GYN SURGERY  5/10/2014    Performed by Mindi Germain M.D. at [facility]       Social History  Social History     Tobacco Use    Smoking status: Former     Current packs/day: 0.00     Average packs/day: 0.3 packs/day for 10.0 years (2.5 ttl pk-yrs)     Types: Cigarettes     Start date: 10/24/2011     Quit date: 10/24/2021     Years since quittin.8    Smokeless tobacco: Never   Vaping Use    Vaping Use: Former    Substances: THC    Devices: Disposable   Substance Use Topics    Alcohol use: Not Currently     Comment: not currently    Drug use: Not Currently     Types: Marijuana        Family History  Family History   Problem Relation Age of Onset    Diabetes Maternal Grandmother     Hypertension Maternal Grandmother     Cancer Maternal Grandfather 60        colon cancer    Diabetes Paternal Grandmother     Heart Disease Paternal Grandfather     Diabetes Paternal Grandfather 50        fatal MI    Hyperlipidemia Mother     Diabetes Mother     Lung Disease Mother         asthma    Hypertension Mother     Hypertension Father     Lung Disease Brother        Home Medications  Current Outpatient Medications   Medication Sig    norgestimate-ethinyl estradiol (ORTHO-CYCLEN) 0.25-35 MG-MCG per tablet Take 1 Tablet by mouth every day.    triamcinolone acetonide (KENALOG) 0.1 % Ointment APPLY TO AFFECTED AREA TWICE DAILY AS NEEDED SPARINGLY AS DIRECTED.    oxyCODONE immediate-release (ROXICODONE) 5 MG Tab Take 1 Tablet by mouth 2 times a day as needed.    meloxicam (MOBIC) 15 MG tablet TAKE 1 TABLET BY MOUTH EVERY DAY    medroxyPROGESTERone 150 MG/ML Suspension Prefilled Syringe  (Patient not taking: Reported on 2023)    acetaminophen (TYLENOL) 500 MG Tab Take 2 Tablets by mouth 2 times a day. Indications: Pain       Allergies/Reactions  No Known Allergies    ROS  Positive ROS: none  Gen: no fevers or chills, no significant weight loss or gain, excessive  "fatigue  Respiratory:  no cough or dyspnea  Cardiac:  no chest pain, no palpitations, no syncope  Breast: no breast discharge, pain, lump or skin changes  GI:  no heartburn, no abdominal pain, no nausea or vomiting  Urinary: no dysuria, urgency, frequency, incontinence   Psych: no depression or anxiety  Neuro: no migraines with aura, fainting spells, numbness or tingling  Extremities: no joint pain, persistently swollen ankles, recurrent leg cramps      Physical Examination:  Vital Signs: /81 (BP Location: Left arm, Patient Position: Sitting, BP Cuff Size: Adult)   Ht 5' 6\"   Wt 150 lb   LMP 07/20/2023 (Exact Date)   BMI 24.21 kg/m²       Constitutional: The patient is well developed and well nourished.  Psychiatric: Patient is oriented to time place and person.   Skin: No rash observed.  Neck: Appears symmetric. Thyroid normal size  Respiratory: normal effort  Breast: Inspection reveals no asymetry or nipple discharge, no skin thickening, dimpling or erythema.  Palpation demonstrates no masses.  Abdomen: Soft, non-tender.  Pelvic Exam:      Vulva: external female genitalia are normal in appearance. No lesions     Urethra - no lesions, no erythema     Vagina: moist, pink, normal ruggae     Cervix: pink, smooth, no lesions, no CMT     Uterus - non-tender, normal size, shape, contour, mobile, anteverted     Ovaries: non-tender, no appreciable masses    Pap Smear performed: Yes    Chaperone Present: Tg Blackman MA  Extremeties: Legs are symmetric and without tenderness. There is no edema present.        Assessment & Plan  Johanna Mata is a 28 y.o. female who presents today for Annual Gyn Exam.     1. Encounter for well woman exam  - Anticipatory guidance given.   - Encouraged adequate water intake, healthy diet, regular exercise.   - Educated on Pap smear screening and guidelines for age per ACOG and ASSCP.   - Discussed safe sex, STI prevention, contraception/family planning.   - Self " breast exam taught.   - THINPREP PAP WITH HPV; Future    2. Screening for malignant neoplasm of cervix  - THINPREP PAP WITH HPV; Future    3. Birth control counseling  - Birth control options discussed in detail including pill, patch, ring, shot, implant, IUD including use and adverse effects of each option.  Discussed that OCP and ring allow for scheduled monthly periods and or manipulation of cycles if desired to skip periods.  Side effect profile for each is similar. Dicussed that progesterone only options, namely shot and implant are most associated with weight gain. Discussed abnormal bleeding associated progesterone only options, often being most common reason for stopping/removal. Patient with no history of uncontrolled HTN or DM, denies history of migraines with aura or blood clots. Discussed possible SE of any hormonal contraception may include: N/V, HA, menstrual changes, weight fluctuation, breast tenderness, abdominal pain, anxiety or depression, DVT. Counseled that contraception does not protect against STDs and condom use was recommended  - Reviewed pill initiation, importance of compliance, missed pills, common and severe side effects.   - norgestimate-ethinyl estradiol (ORTHO-CYCLEN) 0.25-35 MG-MCG per tablet; Take 1 Tablet by mouth every day.  Dispense: 84 Tablet; Refill: 3              Return: Annually or PRN    Shanon Bartlett P.A.-C.

## 2023-09-03 ENCOUNTER — APPOINTMENT (OUTPATIENT)
Dept: RADIOLOGY | Facility: MEDICAL CENTER | Age: 28
End: 2023-09-03
Attending: EMERGENCY MEDICINE
Payer: OTHER MISCELLANEOUS

## 2023-09-03 ENCOUNTER — HOSPITAL ENCOUNTER (EMERGENCY)
Facility: MEDICAL CENTER | Age: 28
End: 2023-09-03
Attending: EMERGENCY MEDICINE
Payer: OTHER MISCELLANEOUS

## 2023-09-03 VITALS
BODY MASS INDEX: 24.06 KG/M2 | OXYGEN SATURATION: 96 % | HEART RATE: 91 BPM | WEIGHT: 149.69 LBS | HEIGHT: 66 IN | RESPIRATION RATE: 18 BRPM | TEMPERATURE: 97.5 F | DIASTOLIC BLOOD PRESSURE: 94 MMHG | SYSTOLIC BLOOD PRESSURE: 146 MMHG

## 2023-09-03 DIAGNOSIS — G89.29 CHRONIC PAIN OF RIGHT KNEE: ICD-10-CM

## 2023-09-03 DIAGNOSIS — M25.551 CHRONIC PAIN OF RIGHT HIP: ICD-10-CM

## 2023-09-03 DIAGNOSIS — G89.29 CHRONIC PAIN OF RIGHT HIP: ICD-10-CM

## 2023-09-03 DIAGNOSIS — M25.561 CHRONIC PAIN OF RIGHT KNEE: ICD-10-CM

## 2023-09-03 PROCEDURE — 96372 THER/PROPH/DIAG INJ SC/IM: CPT

## 2023-09-03 PROCEDURE — 99284 EMERGENCY DEPT VISIT MOD MDM: CPT

## 2023-09-03 PROCEDURE — 73502 X-RAY EXAM HIP UNI 2-3 VIEWS: CPT | Mod: RT

## 2023-09-03 PROCEDURE — 700111 HCHG RX REV CODE 636 W/ 250 OVERRIDE (IP): Performed by: EMERGENCY MEDICINE

## 2023-09-03 RX ORDER — IBUPROFEN 800 MG/1
800 TABLET ORAL EVERY 8 HOURS PRN
Status: SHIPPED | COMMUNITY
End: 2024-02-14

## 2023-09-03 RX ORDER — HYDROCODONE BITARTRATE AND ACETAMINOPHEN 7.5; 325 MG/1; MG/1
1 TABLET ORAL 2 TIMES DAILY
Status: SHIPPED | COMMUNITY
End: 2024-02-14

## 2023-09-03 RX ORDER — TRAMADOL HYDROCHLORIDE 50 MG/1
50 TABLET ORAL 2 TIMES DAILY
Status: SHIPPED | COMMUNITY
End: 2024-02-14

## 2023-09-03 RX ORDER — PREDNISONE 10 MG/1
40 TABLET ORAL ONCE
Status: COMPLETED | OUTPATIENT
Start: 2023-09-03 | End: 2023-09-03

## 2023-09-03 RX ORDER — MELOXICAM 15 MG/1
15 TABLET ORAL DAILY
COMMUNITY

## 2023-09-03 RX ORDER — CYCLOBENZAPRINE HCL 10 MG
10 TABLET ORAL 3 TIMES DAILY PRN
COMMUNITY

## 2023-09-03 RX ORDER — PREDNISONE 20 MG/1
20 TABLET ORAL DAILY
Qty: 4 TABLET | Refills: 0 | Status: SHIPPED | OUTPATIENT
Start: 2023-09-04 | End: 2023-09-08

## 2023-09-03 RX ADMIN — PREDNISONE 40 MG: 10 TABLET ORAL at 17:39

## 2023-09-03 RX ADMIN — FENTANYL CITRATE 100 MCG: 50 INJECTION, SOLUTION INTRAMUSCULAR; INTRAVENOUS at 17:39

## 2023-09-03 NOTE — ED PROVIDER NOTES
"ER Provider Note    Scribed for Young Pires M.d. by Mahendra Scruggs. 9/3/2023  4:51 PM    Primary Care Provider: Bong Kessler M.D.    CHIEF COMPLAINT  Chief Complaint   Patient presents with    Knee Pain    Hip Pain     EXTERNAL RECORDS REVIEWED  Other Patient's PDMP records show 56 tramadol flled on 8/23, 120 7.5 mg Norco filled on 8/25, and 56 tramadol filled on 8/28.    HPI/ROS  LIMITATION TO HISTORY   Select: : None    OUTSIDE HISTORIAN(S):  None    Johanna Mata is a 28 y.o. female who presents to the ED complaining of worsening knee right hip pain onset 10 months ago. The patient states that this is an ongoing workers compensation case for a 3 foot meter box fall that occurred while at work in November of last year. She notes that she hit her shin on her right leg during that fall at work. She reports that her hip has been bothering her more which led her to present to the ED today. She notes the pain is exacerbated when moving and has limited range of motion to her right knee. She reports difficulty performing daily activities due to her pain. She notes that she had a hip surgery in June, 2023 for the injuries she sustained last year in November. She believes her worsening symptoms presented today are associated with the surgery. She also notes her pain is radiating to her right lower back. She also notes an induration to the right anterior tibia that she believes is associated with the injury from November. She notes that she has pain in that area when palpated. Patient denies current fever. She notes that she has been having trouble reaching her workers compensation. She reports she has a pain management and ortho MD that she receives outpatient care with. She notes that she needed help getting onto the ED bed by the nurse due to the pain. Wang any recent injuries. She notes that she received a cortisone shot to the right knee and induration noted above with a \"10% alleviation in " "pain.\"     PAST MEDICAL HISTORY  History reviewed. No pertinent past medical history.    SURGICAL HISTORY  Past Surgical History:   Procedure Laterality Date    TN ARTHROSCOPY HIP W/LABRAL REPAIR Right 6/16/2022    Procedure: Right hip arthroscopy, labral repair, acetabular rim trimming, femoral neck osteochondroplasty;  Surgeon: Tejinder Solis M.D.;  Location: [facility];  Service: Orthopedics    TN ARTHROSCOPY HIP W/FEMOROPLASTY Right 6/16/2022    Procedure: OSTEOPLASTY, FEMUR, NECK;  Surgeon: Tejinder Solis M.D.;  Location: [facility];  Service: Orthopedics    TN ARTHROSCOPY HIP W/ACETABULOPLASTY Right 6/16/2022    Procedure: OSTEOPLASTY, ACETABULUM;  Surgeon: Tejinder Solis M.D.;  Location: [facility];  Service: Orthopedics    GYN SURGERY  5/10/2014    Performed by Mindi Germain M.D. at [facility]     FAMILY HISTORY  Family History   Problem Relation Age of Onset    Diabetes Maternal Grandmother     Hypertension Maternal Grandmother     Cancer Maternal Grandfather 60        colon cancer    Diabetes Paternal Grandmother     Heart Disease Paternal Grandfather     Diabetes Paternal Grandfather 50        fatal MI    Hyperlipidemia Mother     Diabetes Mother     Lung Disease Mother         asthma    Hypertension Mother     Hypertension Father     Lung Disease Brother      SOCIAL HISTORY   reports that she quit smoking about 22 months ago. Her smoking use included cigarettes. She started smoking about 11 years ago. She has a 2.5 pack-year smoking history. She has never used smokeless tobacco. She reports that she does not currently use alcohol. She reports that she does not currently use drugs after having used the following drugs: Marijuana.    CURRENT MEDICATIONS  Previous Medications    ACETAMINOPHEN (TYLENOL) 500 MG TAB    Take 2 Tablets by mouth 2 times a day. Indications: Pain    MEDROXYPROGESTERONE " "150 MG/ML SUSPENSION PREFILLED SYRINGE        MELOXICAM (MOBIC) 15 MG TABLET    TAKE 1 TABLET BY MOUTH EVERY DAY    NORGESTIMATE-ETHINYL ESTRADIOL (ORTHO-CYCLEN) 0.25-35 MG-MCG PER TABLET    Take 1 Tablet by mouth every day.    OXYCODONE IMMEDIATE-RELEASE (ROXICODONE) 5 MG TAB    Take 1 Tablet by mouth 2 times a day as needed.    TRIAMCINOLONE ACETONIDE (KENALOG) 0.1 % OINTMENT    APPLY TO AFFECTED AREA TWICE DAILY AS NEEDED SPARINGLY AS DIRECTED.     ALLERGIES  Patient has no known allergies.    PHYSICAL EXAM  VITAL SIGNS: BP (!) 146/94   Pulse 91   Temp 36.4 °C (97.5 °F) (Temporal)   Resp 18   Ht 1.676 m (5' 6\")   Wt 67.9 kg (149 lb 11.1 oz)   LMP 07/20/2023 (Exact Date)   SpO2 96%   BMI 24.16 kg/m²   Pulse ox interpretation: I interpret this pulse ox as normal.  Constitutional: Alert in no apparent distress.  HENT: No signs of trauma, Bilateral external ears normal, Nose normal.   Eyes: Conjunctiva normal, Non-icteric.   Neck: Normal range of motion, Supple, No stridor.   Lymphatic: No lymphadenopathy noted.   Cardiovascular: Regular rate and rhythm, no murmurs.   Thorax & Lungs: Normal breath sounds, No respiratory distress, No wheezing  Abdomen: Bowel sounds normal, Soft, No tenderness, No masses, No pulsatile masses. No peritoneal signs.  Skin: Warm, Dry, No erythema, No rash.   Back: No midline bony tenderness.   Extremities: Intact distal pulses, No edema, No cyanosis.  Musculoskeletal: No visible or palpable abnormalities to right knee, there is a slightly tender subcutaneous approximately 1 cm mobile firm lump a couple inches below proximal tibia from the knee, no overlying skin changes or suggestion of cellulitis or abscess. Slightly decreased range of motion to right hip and knee reported to be chronic by patient. No major deformities noted.   Neurologic: Alert , Normal motor function, Normal sensory function, No focal deficits noted.   Psychiatric: Affect normal, Judgment normal, Mood normal. "     DIAGNOSTIC STUDIES    Radiology:   The attending emergency physician has independently interpreted the diagnostic imaging associated with this visit and am waiting the final reading from the radiologist.   Preliminary interpretation is a follows: No joint effusion.  No fracture.  Radiologist interpretation:   DX-HIP-COMPLETE - UNILATERAL 2+ RIGHT   Final Result      Normal.        COURSE & MEDICAL DECISION MAKING     ED Observation Status? Yes; I am placing the patient in to an observation status due to a diagnostic uncertainty as well as therapeutic intensity. Patient placed in observation status at 5:27 PM, 9/3/2023.     Observation plan is as follows: I will monitor the patient pending imaging and treat patient symptoms if necessary.     Upon Reevaluation, the patient's condition has: Improved; and will be discharged.    Patient discharged from ED Observation status at 6:21 PM (Time) 9/3/2023 (Date).     INITIAL ASSESSMENT, COURSE AND PLAN  Care Narrative:     4:51 PM Patient presents to the ED with right knee and hip pain. Patient evaluated at bedside and discussed plan of care, including imaging and pain medication for symptom relief. Patient will be treated with Deltasone 40 mg and Sublimaze 100 mcg.  I think the steroids may be helpful, as she reports some radiation of pain to both sides of her back and down the backs of her legs.  However, there were no signs of cauda equina or reason to suspect an infectious cause of her symptoms, such as discitis or epidural abscess.  Ordered for DX-Hip complete unilateral 2+ right to evaluate her symptoms. Differential diagnoses include but not limited to: Chronic pain, degenerative joint disease, less likely hip joint effusion or septic arthritis.     6:37 PM - Patient was reevaluated at bedside. Discussed imaging results with the patient. Plan for continual outpatient ortho care discussed.  She is already well-established with orthopedics, though having some  difficulty navigating the referral process for joint injection.  I discussed that unfortunately, this is not something I can fix for her, but will refer to occupational health, as she does not appear to be established with them, and this may help with ongoing navigation of this reported work-related injury.  Patient had the opportunity to ask any questions. The plan for discharge was discussed with them and they were told to return for any new or worsening symptoms. She was also informed of the plans for follow up. Patient is understanding and agreeable to the plan for discharge.         ADDITIONAL PROBLEM LIST  Chronic pain syndrome.  Therapeutically prescribed narcotic dependence.    DISPOSITION AND DISCUSSIONS  I have discussed management of the patient with the following physicians and KELLY's:  None    Escalation of care considered, and ultimately not performed: blood analysis.  I do not see any reason to suspect systemic illness, or further work-up for septic arthritis.  Decision tools and prescription drugs considered including, but not limited to: Medication modification was performed at discharge, to provide a steroid burst, and Voltaren .    The patient will return for new or worsening symptoms and is stable at the time of discharge.    The patient is referred to a primary physician for blood pressure management, diabetic screening, and for all other preventative health concerns.    DISPOSITION:  Patient will be discharged home in stable condition.    FOLLOW UP:  No follow-up provider specified.    OUTPATIENT MEDICATIONS:  Discharge Medication List as of 9/3/2023  7:00 PM        START taking these medications    Details   predniSONE (DELTASONE) 20 MG Tab Take 1 Tablet by mouth every day for 4 days., Disp-4 Tablet, R-0, Normal      diclofenac sodium (VOLTAREN) 1 % Gel Apply 2 g topically 4 times a day as needed (joint pain)., Disp-150 g, R-1, Normal            FINAL DIAGNOSIS  1. Chronic pain of right hip     2. Chronic pain of right knee           Mahendra FU (Scribe), am scribing for, and in the presence of, Young Pires M.D..    Electronically signed by: Mahendra Scruggs (Brandonibmary kate), 9/3/2023    Young FU M.D. personally performed the services described in this documentation, as scribed by Mahendra Scruggs in my presence, and it is both accurate and complete.

## 2023-09-03 NOTE — LETTER
[facility], EMERGENCY DEPT   [address] [place] [address]  Phone: Dept: [phone] - Fax:        [facility] Progress Report and Disability Certification  Date of Service: 9/3/2023   No Show:  No  Date / Time of Next Visit:     Claim Information   Patient Name: Johanna Mata  Claim Number:     Employer:    Date of Injury: 11/7/2022     Insurer / TPA: Misc Workers Comp ID / SSN:    Occupation:  Diagnosis: Diagnoses of Chronic pain of right hip and Chronic pain of right knee were pertinent to this visit.    Medical Information   Related to Industrial Injury?   Comments:Unknown. Refer to original visits.    Subjective Complaints:  Chronic R hip and R knee pain.   Objective Findings: None.   Pre-Existing Condition(s): Pain in R knee and R hip for last year.    Assessment:   Condition Same    Status: Additional Care Required  Permanent Disability:No    Plan: MedicationTransfer Care    Diagnostics: X-ray    Comments:       Disability Information   Status:   Comments:Refer to any current restrictions.    From:     Through:   Restrictions are:     Physical Restrictions   Sitting:    Standing:    Stooping:    Bending:      Squatting:    Walking:    Climbing:    Pushing:      Pulling:    Other:    Reaching Above Shoulder (L):   Reaching Above Shoulder (R):       Reaching Below Shoulder (L):    Reaching Below Shoulder (R):      Not to exceed Weight Limits   Carrying(hrs):   Weight Limit(lb):   Lifting(hrs):   Weight  Limit(lb):     Comments:      Repetitive Actions   Hands: i.e. Fine Manipulations from Grasping:     Feet: i.e. Operating Foot Controls:     Driving / Operate Machinery:     Physician Name: Eli Pires Physician Signature: ELI Mott M.D. e-Signature:  , Medical Director   Clinic Name / Location: [facility], EMERGENCY  DEPT  [address]  [place] [address]  [phone]     Clinic Phone Number: Dept: [phone]   Appointment Time:  Visit Start Time:    Check-In Time:  3:34 PM Visit Discharge Time:    Original-Treating Physician or Chiropractor    Page 2-Insurer/TPA    Page 3-Employer    Page 4-Employee

## 2023-09-03 NOTE — LETTER
[facility], EMERGENCY DEPT   [address] [address]  Phone: Dept: [phone] - Fax:        [facility] Progress Report and Disability Certification  Date of Service: 9/3/2023   No Show:  No  Date / Time of Next Visit:     Claim Information   Patient Name: Johanna Mata  Claim Number:     Employer:    Date of Injury: 11/7/2022     Insurer / TPA: Misc Workers Comp ID / SSN: [id number]    Occupation:  Diagnosis: Diagnoses of Chronic pain of right hip and Chronic pain of right knee were pertinent to this visit.    Medical Information   Related to Industrial Injury?      Subjective Complaints:      Objective Findings:     Pre-Existing Condition(s):     Assessment:        Status:    Permanent Disability:     Plan:      Diagnostics:      Comments:       Disability Information   Status:      From:     Through:   Restrictions are:     Physical Restrictions   Sitting:    Standing:    Stooping:    Bending:      Squatting:    Walking:    Climbing:    Pushing:      Pulling:    Other:    Reaching Above Shoulder (L):   Reaching Above Shoulder (R):       Reaching Below Shoulder (L):    Reaching Below Shoulder (R):      Not to exceed Weight Limits   Carrying(hrs):   Weight Limit(lb):   Lifting(hrs):   Weight  Limit(lb):     Comments:      Repetitive Actions   Hands: i.e. Fine Manipulations from Grasping:     Feet: i.e. Operating Foot Controls:     Driving / Operate Machinery:     Physician Name: Young Pires Physician Signature:   e-Signature:  , Medical Director   Clinic Name / Location: [facility], EMERGENCY DEPT  [address] [place] [address] [phone]     Clinic Phone Number: Dept: [phone]   Appointment Time:  Visit Start Time:    Check-In Time:  3:34 PM Visit Discharge Time:    Original-Treating Physician or Chiropractor    Page  2-Insurer/TPA    Page 3-Employer    Page 4-Employee

## 2023-09-04 NOTE — DISCHARGE INSTRUCTIONS
Your hip x-ray was normal.  There is no suggestion of developing infection or degenerative change.  We have referred you to occupational health to help navigate the work-related injury follow-ups.  The prescribed steroids should provide some additional relief, in combination with your home medications, and the topical pain medication prescribed.

## 2023-09-04 NOTE — ED NOTES
Med rec updated and complete, per pt   Allergies reviewed, per pt  Pt reports that she stopped that IBUPROFEN 800MG about 2 weeks and started MELOXICAM 15MG QDAY.

## 2023-09-06 LAB
CYTOLOGIST CVX/VAG CYTO: ABNORMAL
CYTOLOGY CVX/VAG DOC CYTO: ABNORMAL
CYTOLOGY CVX/VAG DOC THIN PREP: ABNORMAL
HPV I/H RISK 4 DNA CVX QL PROBE+SIG AMP: POSITIVE
HPV REFLEX NL1174300: ABNORMAL
HPV16 DNA CVX QL PROBE+SIG AMP: NEGATIVE
HPV18+45 E6+E7 MRNA CVX QL NAA+PROBE: NEGATIVE
NOTE NL11727A: ABNORMAL
OTHER STN SPEC: ABNORMAL
QC REVIEWED BY NL11722A: ABNORMAL
STAT OF ADQ CVX/VAG CYTO-IMP: ABNORMAL

## 2024-01-30 DIAGNOSIS — Z00.00 WELL ADULT EXAM: ICD-10-CM

## 2024-02-12 SDOH — HEALTH STABILITY: PHYSICAL HEALTH: ON AVERAGE, HOW MANY MINUTES DO YOU ENGAGE IN EXERCISE AT THIS LEVEL?: 30 MIN

## 2024-02-12 SDOH — ECONOMIC STABILITY: INCOME INSECURITY: IN THE LAST 12 MONTHS, WAS THERE A TIME WHEN YOU WERE NOT ABLE TO PAY THE MORTGAGE OR RENT ON TIME?: NO

## 2024-02-12 SDOH — ECONOMIC STABILITY: INCOME INSECURITY: HOW HARD IS IT FOR YOU TO PAY FOR THE VERY BASICS LIKE FOOD, HOUSING, MEDICAL CARE, AND HEATING?: NOT VERY HARD

## 2024-02-12 SDOH — ECONOMIC STABILITY: FOOD INSECURITY: WITHIN THE PAST 12 MONTHS, YOU WORRIED THAT YOUR FOOD WOULD RUN OUT BEFORE YOU GOT MONEY TO BUY MORE.: NEVER TRUE

## 2024-02-12 SDOH — ECONOMIC STABILITY: HOUSING INSECURITY: IN THE LAST 12 MONTHS, HOW MANY PLACES HAVE YOU LIVED?: 2

## 2024-02-12 SDOH — ECONOMIC STABILITY: FOOD INSECURITY: WITHIN THE PAST 12 MONTHS, THE FOOD YOU BOUGHT JUST DIDN'T LAST AND YOU DIDN'T HAVE MONEY TO GET MORE.: NEVER TRUE

## 2024-02-12 SDOH — HEALTH STABILITY: PHYSICAL HEALTH: ON AVERAGE, HOW MANY DAYS PER WEEK DO YOU ENGAGE IN MODERATE TO STRENUOUS EXERCISE (LIKE A BRISK WALK)?: 3 DAYS

## 2024-02-12 ASSESSMENT — SOCIAL DETERMINANTS OF HEALTH (SDOH)
HOW OFTEN DO YOU ATTENT MEETINGS OF THE CLUB OR ORGANIZATION YOU BELONG TO?: PATIENT DECLINED
HOW OFTEN DO YOU ATTEND CHURCH OR RELIGIOUS SERVICES?: NEVER
ARE YOU MARRIED, WIDOWED, DIVORCED, SEPARATED, NEVER MARRIED, OR LIVING WITH A PARTNER?: NEVER MARRIED
HOW OFTEN DO YOU ATTENT MEETINGS OF THE CLUB OR ORGANIZATION YOU BELONG TO?: PATIENT DECLINED
DO YOU BELONG TO ANY CLUBS OR ORGANIZATIONS SUCH AS CHURCH GROUPS UNIONS, FRATERNAL OR ATHLETIC GROUPS, OR SCHOOL GROUPS?: NO
HOW OFTEN DO YOU HAVE SIX OR MORE DRINKS ON ONE OCCASION: NEVER
IN A TYPICAL WEEK, HOW MANY TIMES DO YOU TALK ON THE PHONE WITH FAMILY, FRIENDS, OR NEIGHBORS?: MORE THAN THREE TIMES A WEEK
HOW HARD IS IT FOR YOU TO PAY FOR THE VERY BASICS LIKE FOOD, HOUSING, MEDICAL CARE, AND HEATING?: NOT VERY HARD
ARE YOU MARRIED, WIDOWED, DIVORCED, SEPARATED, NEVER MARRIED, OR LIVING WITH A PARTNER?: NEVER MARRIED
HOW OFTEN DO YOU GET TOGETHER WITH FRIENDS OR RELATIVES?: MORE THAN THREE TIMES A WEEK
HOW MANY DRINKS CONTAINING ALCOHOL DO YOU HAVE ON A TYPICAL DAY WHEN YOU ARE DRINKING: PATIENT DOES NOT DRINK
IN A TYPICAL WEEK, HOW MANY TIMES DO YOU TALK ON THE PHONE WITH FAMILY, FRIENDS, OR NEIGHBORS?: MORE THAN THREE TIMES A WEEK
HOW OFTEN DO YOU HAVE A DRINK CONTAINING ALCOHOL: NEVER
DO YOU BELONG TO ANY CLUBS OR ORGANIZATIONS SUCH AS CHURCH GROUPS UNIONS, FRATERNAL OR ATHLETIC GROUPS, OR SCHOOL GROUPS?: NO
HOW OFTEN DO YOU GET TOGETHER WITH FRIENDS OR RELATIVES?: MORE THAN THREE TIMES A WEEK
WITHIN THE PAST 12 MONTHS, YOU WORRIED THAT YOUR FOOD WOULD RUN OUT BEFORE YOU GOT THE MONEY TO BUY MORE: NEVER TRUE
HOW OFTEN DO YOU ATTEND CHURCH OR RELIGIOUS SERVICES?: NEVER

## 2024-02-12 ASSESSMENT — LIFESTYLE VARIABLES
AUDIT-C TOTAL SCORE: 0
HOW OFTEN DO YOU HAVE A DRINK CONTAINING ALCOHOL: NEVER
SKIP TO QUESTIONS 9-10: 1
HOW MANY STANDARD DRINKS CONTAINING ALCOHOL DO YOU HAVE ON A TYPICAL DAY: PATIENT DOES NOT DRINK
HOW OFTEN DO YOU HAVE SIX OR MORE DRINKS ON ONE OCCASION: NEVER

## 2024-02-13 ENCOUNTER — APPOINTMENT (OUTPATIENT)
Dept: MEDICAL GROUP | Facility: PHYSICIAN GROUP | Age: 29
End: 2024-02-13
Payer: COMMERCIAL

## 2024-02-14 ENCOUNTER — OFFICE VISIT (OUTPATIENT)
Dept: MEDICAL GROUP | Facility: PHYSICIAN GROUP | Age: 29
End: 2024-02-14
Payer: COMMERCIAL

## 2024-02-14 VITALS
TEMPERATURE: 97.8 F | DIASTOLIC BLOOD PRESSURE: 74 MMHG | WEIGHT: 154.5 LBS | HEART RATE: 96 BPM | HEIGHT: 66 IN | BODY MASS INDEX: 24.83 KG/M2 | OXYGEN SATURATION: 96 % | SYSTOLIC BLOOD PRESSURE: 116 MMHG | RESPIRATION RATE: 16 BRPM

## 2024-02-14 DIAGNOSIS — Z23 NEED FOR VACCINATION: ICD-10-CM

## 2024-02-14 DIAGNOSIS — Z00.00 ANNUAL PHYSICAL EXAM: ICD-10-CM

## 2024-02-14 DIAGNOSIS — R73.03 PREDIABETES: ICD-10-CM

## 2024-02-14 DIAGNOSIS — E55.9 VITAMIN D DEFICIENCY: Chronic | ICD-10-CM

## 2024-02-14 DIAGNOSIS — L30.9 ECZEMA, UNSPECIFIED TYPE: ICD-10-CM

## 2024-02-14 DIAGNOSIS — M54.50 CHRONIC LOW BACK PAIN, UNSPECIFIED BACK PAIN LATERALITY, UNSPECIFIED WHETHER SCIATICA PRESENT: ICD-10-CM

## 2024-02-14 DIAGNOSIS — E78.5 DYSLIPIDEMIA: Chronic | ICD-10-CM

## 2024-02-14 DIAGNOSIS — G89.29 CHRONIC LOW BACK PAIN, UNSPECIFIED BACK PAIN LATERALITY, UNSPECIFIED WHETHER SCIATICA PRESENT: ICD-10-CM

## 2024-02-14 PROCEDURE — 99395 PREV VISIT EST AGE 18-39: CPT | Mod: 25 | Performed by: INTERNAL MEDICINE

## 2024-02-14 PROCEDURE — 90686 IIV4 VACC NO PRSV 0.5 ML IM: CPT | Performed by: INTERNAL MEDICINE

## 2024-02-14 PROCEDURE — 3078F DIAST BP <80 MM HG: CPT | Performed by: INTERNAL MEDICINE

## 2024-02-14 PROCEDURE — 3074F SYST BP LT 130 MM HG: CPT | Performed by: INTERNAL MEDICINE

## 2024-02-14 PROCEDURE — 90471 IMMUNIZATION ADMIN: CPT | Performed by: INTERNAL MEDICINE

## 2024-02-14 RX ORDER — ROSUVASTATIN CALCIUM 10 MG/1
10 TABLET, COATED ORAL EVERY EVENING
Qty: 90 TABLET | Refills: 3 | Status: SHIPPED | OUTPATIENT
Start: 2024-02-14

## 2024-02-14 RX ORDER — TRIAMCINOLONE ACETONIDE 1 MG/G
CREAM TOPICAL
Qty: 80 G | Refills: 2 | Status: SHIPPED | OUTPATIENT
Start: 2024-02-14

## 2024-02-14 RX ORDER — OXYCODONE HYDROCHLORIDE AND ACETAMINOPHEN 10; 325 MG/1; MG/1
TABLET ORAL
COMMUNITY
Start: 2023-12-01

## 2024-02-14 ASSESSMENT — PATIENT HEALTH QUESTIONNAIRE - PHQ9: CLINICAL INTERPRETATION OF PHQ2 SCORE: 0

## 2024-02-14 NOTE — ASSESSMENT & PLAN NOTE
This is a chronic condition.  Recent lab test show patient with elevated cholesterol 255   Potential risks of uncontrolled hyperlipidemia discussed with the patient.

## 2024-02-14 NOTE — ASSESSMENT & PLAN NOTE
Is a chronic condition.  Patient presently taking vitamin D3.  Recent blood test show normal vitamin D level.

## 2024-02-14 NOTE — PROGRESS NOTES
PRIMARY CARE CLINIC VISIT    Chief complaint:    Pt is here for Annual Exam      History of Present Illness     Dyslipidemia  This is a chronic condition.  Recent lab test show patient with elevated cholesterol 255   Potential risks of uncontrolled hyperlipidemia discussed with the patient.    Eczema  Chronic condition.  The patient has been using triamcinolone cream 0.1% twice daily as needed.  She request Rx refill.  No new symptoms reported.    Vitamin D deficiency  Is a chronic condition.  Patient presently taking vitamin D3.  Recent blood test show normal vitamin D level.    Prediabetes  Chronic condition.  Patient currently on diet therapy.  Recent blood test show A1c 5.8%.      Chronic low back pain  Chronic ongoing condition.  Patient followed by pain specialist.  Patient presently taking Percocet 10-3 25 as needed.  Patient tolerated medication well.  No significant side effects reported.      Allergies: Patient has no known allergies.    Current Outpatient Medications Ordered in Epic   Medication Sig Dispense Refill    PERCOCET  MG Tab       triamcinolone acetonide (KENALOG) 0.1 % Cream Apply to affected areas bid prn sparingly for up to 14 days. 80 g 2    rosuvastatin (CRESTOR) 10 MG Tab Take 1 Tablet by mouth every evening. 90 Tablet 3    BIOTIN PO Place 1 Tablet under the tongue every day.      Cholecalciferol (D3 PO) Take 1 Capsule by mouth every day.      multivitamin Tab Take 1 Tablet by mouth every day.      cyclobenzaprine (FLEXERIL) 10 mg Tab Take 10 mg by mouth 3 times a day as needed for Muscle Spasms.      meloxicam (MOBIC) 15 MG tablet Take 15 mg by mouth every day.      diclofenac sodium (VOLTAREN) 1 % Gel Apply 2 g topically 4 times a day as needed (joint pain). 150 g 1    norgestimate-ethinyl estradiol (ORTHO-CYCLEN) 0.25-35 MG-MCG per tablet Take 1 Tablet by mouth every day. (Patient taking differently: Take 1 Tablet by mouth every day. Pt started today (9/3/2023)) 84 Tablet 3      No current Epic-ordered facility-administered medications on file.       History reviewed. No pertinent past medical history.    Past Surgical History:   Procedure Laterality Date    NH ARTHROSCOPY HIP W/LABRAL REPAIR Right 2022    Procedure: Right hip arthroscopy, labral repair, acetabular rim trimming, femoral neck osteochondroplasty;  Surgeon: Tejinder Solis M.D.;  Location: [facility];  Service: Orthopedics    NH ARTHROSCOPY HIP W/FEMOROPLASTY Right 2022    Procedure: OSTEOPLASTY, FEMUR, NECK;  Surgeon: Tejinder Solis M.D.;  Location: [facility];  Service: Orthopedics    NH ARTHROSCOPY HIP W/ACETABULOPLASTY Right 2022    Procedure: OSTEOPLASTY, ACETABULUM;  Surgeon: Tejinder Solis M.D.;  Location: [facility];  Service: Orthopedics    GYN SURGERY  5/10/2014    Performed by Mindi Germain M.D. at [facility]       Family History   Problem Relation Age of Onset    Diabetes Maternal Grandmother     Hypertension Maternal Grandmother     Cancer Maternal Grandfather 60        colon cancer    Diabetes Paternal Grandmother     Heart Disease Paternal Grandfather     Diabetes Paternal Grandfather 50        fatal MI    Hyperlipidemia Mother     Diabetes Mother     Lung Disease Mother         asthma    Hypertension Mother     Hypertension Father     Lung Disease Brother        Social History     Tobacco Use   Smoking Status Former    Current packs/day: 0.00    Average packs/day: 0.3 packs/day for 10.0 years (2.5 ttl pk-yrs)    Types: Cigarettes    Start date: 10/24/2011    Quit date: 10/24/2021    Years since quittin.3   Smokeless Tobacco Never       Social History     Substance and Sexual Activity   Alcohol Use Not Currently    Comment: not currently       Review of systems:  As per HPI above. All other systems reviewed and negative.      Past Medical, Social, and Family history reviewed and  "updated in EPIC     Objective     /74 (BP Location: Right arm, Patient Position: Sitting, BP Cuff Size: Adult)   Pulse 96   Temp 36.6 °C (97.8 °F) (Temporal)   Resp 16   Ht 1.676 m (5' 6\")   Wt 70.1 kg (154 lb 8 oz)   SpO2 96%    Body mass index is 24.94 kg/m².    General: alert in no apparent distress.  Cardiovascular: regular rate and rhythm  Pulmonary: lungs : no wheezing   Gastrointestinal: BS present.     Test results pleated at [facility] done recently discussed with the patient          Assessment and Plan     1. Annual physical exam    2. Dyslipidemia  This is a chronic condition.  Uncontrolled  Patient risks of unknown control hyperlipidemia discussed with the patient  Shared decision making.  Patient will start Crestor 10 mg daily.  Recommend low-fat low-cholesterol diet.  Handout for cholesterol shared with the patient.  - rosuvastatin (CRESTOR) 10 MG Tab; Take 1 Tablet by mouth every evening.  Dispense: 90 Tablet; Refill: 3  - ALANINE AMINO-TRANS; Future  - Lipid Profile; Future    3. Chronic low back pain, unspecified back pain laterality, unspecified whether sciatica present  Chronic stable condition.  [facility]    4. Eczema, unspecified type  - triamcinolone acetonide (KENALOG) 0.1 % Cream; Apply to affected areas bid prn sparingly for up to 14 days.  Dispense: 80 g; Refill: 2  Chronic stable condition.  No new lesion reported  Refill for triamcinolone cream given.      5. Need for vaccination  - INFLUENZA VACCINE QUAD INJ (PF)    6. Prediabetes  This is a chronic condition.  Recommend diet and exercise.  Continue to monitor.    - HEMOGLOBIN A1C; Future  - Basic Metabolic Panel; Future    7. Vitamin D deficiency  This is a chronic stable condition.  Recent blood test show patient with normal vitamin D level.  Continue with vitamin D supplementation daily                ANTICIPATORY GUIDANCE  Patient " Counseling:  -Cholesterol screening. Fasting lipid panel  -Diabetes screening. Fasting blood sugar  -Diet: advised lean meats, fruits, vegetables, whole grains  -Discussed Healthful lifestyle measures. Pt to avoid tobacco, ETOH, and drug use.  -Pt to continue with regular exercise/walking activities  -Advised sun protection, sunscreen use  -Injury prevention: discussed safety seat belts  -Discussed preventive immunizations  -Recommend patient to schedule a yearly eye examination and dental exam                       Please note that this dictation was created using voice recognition software. I have made every reasonable attempt to correct obvious errors, but I expect that there are errors of grammar and possibly content that I did not discover before finalizing the note.    Bong Kessler MD  Internal Medicine  [facility]

## 2024-02-14 NOTE — ASSESSMENT & PLAN NOTE
Chronic condition.  The patient has been using triamcinolone cream 0.1% twice daily as needed.  She request Rx refill.  No new symptoms reported.

## 2024-02-14 NOTE — ASSESSMENT & PLAN NOTE
Chronic ongoing condition.  Patient followed by pain specialist.  Patient presently taking Percocet 10-3 25 as needed.  Patient tolerated medication well.  No significant side effects reported.

## 2024-02-26 ENCOUNTER — APPOINTMENT (OUTPATIENT)
Dept: MEDICAL GROUP | Facility: PHYSICIAN GROUP | Age: 29
End: 2024-02-26
Payer: COMMERCIAL

## 2024-02-28 ENCOUNTER — APPOINTMENT (OUTPATIENT)
Dept: MEDICAL GROUP | Facility: PHYSICIAN GROUP | Age: 29
End: 2024-02-28
Payer: COMMERCIAL

## 2024-07-18 DIAGNOSIS — Z30.09 BIRTH CONTROL COUNSELING: ICD-10-CM

## 2024-07-18 RX ORDER — NORGESTIMATE AND ETHINYL ESTRADIOL 0.25-0.035
1 KIT ORAL
Qty: 84 TABLET | Refills: 3 | Status: SHIPPED | OUTPATIENT
Start: 2024-07-18

## 2024-08-01 ENCOUNTER — OFFICE VISIT (OUTPATIENT)
Dept: URGENT CARE | Facility: PHYSICIAN GROUP | Age: 29
End: 2024-08-01
Payer: COMMERCIAL

## 2024-08-01 VITALS
HEART RATE: 77 BPM | TEMPERATURE: 99.3 F | SYSTOLIC BLOOD PRESSURE: 118 MMHG | BODY MASS INDEX: 24.32 KG/M2 | WEIGHT: 146 LBS | RESPIRATION RATE: 14 BRPM | OXYGEN SATURATION: 97 % | HEIGHT: 65 IN | DIASTOLIC BLOOD PRESSURE: 76 MMHG

## 2024-08-01 DIAGNOSIS — L20.9 ATOPIC DERMATITIS, UNSPECIFIED TYPE: ICD-10-CM

## 2024-08-01 PROCEDURE — 99213 OFFICE O/P EST LOW 20 MIN: CPT | Performed by: FAMILY MEDICINE

## 2024-08-01 PROCEDURE — 3078F DIAST BP <80 MM HG: CPT | Performed by: FAMILY MEDICINE

## 2024-08-01 PROCEDURE — 3074F SYST BP LT 130 MM HG: CPT | Performed by: FAMILY MEDICINE

## 2024-08-01 NOTE — PROGRESS NOTES
"Subjective:   Johanna Mata is a 29 y.o. female who presents for Rash (Behind right left ear x couple weeks )      Has used hydrogen peroxide, neosporin, and multiple other tx, rash is getting worse.    Rash        Review of Systems   Skin:  Positive for rash.       Medications, Allergies, and current problem list reviewed today in Epic.     Objective:     /76   Pulse 77   Temp 37.4 °C (99.3 °F) (Temporal)   Resp 14   Ht 1.651 m (5' 5\")   Wt 66.2 kg (146 lb)   SpO2 97%     Physical Exam  Skin:     Comments: Cracking skin, raised red rash, oozing.         Assessment/Plan:     Diagnosis and associated orders:     1. Atopic dermatitis, unspecified type           Comments/MDM:     Use triamcinolone bid, follow up in 3 days if not improving         Differential diagnosis, natural history, supportive care, and indications for immediate follow-up discussed.    Advised the patient to follow-up with the primary care physician for recheck, reevaluation, and consideration of further management.    Please note that this dictation was created using voice recognition software. I have made a reasonable attempt to correct obvious errors, but I expect that there are errors of grammar and possibly content that I did not discover before finalizing the note.    This note was electronically signed by Leif Noble M.D.  "

## 2024-08-19 ENCOUNTER — APPOINTMENT (OUTPATIENT)
Dept: MEDICAL GROUP | Facility: PHYSICIAN GROUP | Age: 29
End: 2024-08-19
Payer: COMMERCIAL

## 2024-08-19 DIAGNOSIS — E78.5 DYSLIPIDEMIA: Chronic | ICD-10-CM

## 2024-08-19 DIAGNOSIS — R73.03 PREDIABETES: Chronic | ICD-10-CM

## 2024-08-19 DIAGNOSIS — E55.9 VITAMIN D DEFICIENCY: Chronic | ICD-10-CM

## 2024-09-25 ENCOUNTER — APPOINTMENT (OUTPATIENT)
Dept: MEDICAL GROUP | Facility: PHYSICIAN GROUP | Age: 29
End: 2024-09-25
Payer: COMMERCIAL

## 2025-07-05 NOTE — OP THERAPY DAILY TREATMENT
Outpatient Physical Therapy  DAILY TREATMENT     [facility] [place]  [address], [address]  [facility] [address]  Phone:  [phone]  Fax:  [phone]    Date: 08/19/2022    Patient: Johanna Mata  YOB: 1995  MRN: [id number]     Time Calculation    Start time: 0815  Stop time: 0900 Time Calculation (min): 45 minutes         Chief Complaint: Hip Problem and Post-Op Pain    Visit #: 13    SUBJECTIVE:  Patient reports no pain with daily activites. Notes that symptoms of leg pain are improving. Notes overall improved function.     OBJECTIVE:  Current objective measures:           Therapeutic Exercises (CPT 07241):     2. press ups, x10    3. stair training with 4in step ap    4. passive supine hip IR/ER, x2 min     5. prone neutral hip extension stretch, 2 x1 min    6. Bike , x10min upright    7. Supine bridge on round, x20    8. prone hip IR, x15    9. hooklying isometric TA ball press, 5 sec x10    10. SL hip abd, xfatigue    11. hooklying isometric HS curl into ball, 5sec x10    12. supine DKC ball roll, x20     13. hooklying TA bracing with ball OH flexion, x10    14. Stool ER/IR, x20    15. Glut step, x20    16. Gait training no a.d., x15min    Time-based treatments/modalities:    Physical Therapy Timed Treatment Charges  Therapeutic exercise minutes (CPT 46347): 40 minutes      Pain rating (1-10) before treatment:  0  Pain rating (1-10) after treatment:  0    ASSESSMENT:   Response to treatment: Patient responded well to therapy with an overall improvement in symptoms and good exercise tolerance. Plan to continue with advancement of post op protocol.     PLAN/RECOMMENDATIONS:   Plan for treatment: therapy treatment to continue next visit.  Planned interventions for next visit: continue with current treatment.            Problem: Chronic Conditions and Co-morbidities  Goal: Patient's chronic conditions and co-morbidity symptoms are monitored and maintained or improved  7/5/2025 1659 by Vanna Moreland RN  Outcome: Adequate for Discharge  7/5/2025 0725 by Vanna Moreland RN  Outcome: Progressing     Problem: Discharge Planning  Goal: Discharge to home or other facility with appropriate resources  7/5/2025 1659 by Vanna Moreland RN  Outcome: Adequate for Discharge  7/5/2025 0725 by Vanna Moreland RN  Outcome: Progressing